# Patient Record
Sex: FEMALE | Race: WHITE | Employment: OTHER | URBAN - METROPOLITAN AREA
[De-identification: names, ages, dates, MRNs, and addresses within clinical notes are randomized per-mention and may not be internally consistent; named-entity substitution may affect disease eponyms.]

---

## 2019-03-06 ENCOUNTER — HOSPITAL ENCOUNTER (OUTPATIENT)
Dept: PREADMISSION TESTING | Age: 69
Discharge: HOME | End: 2019-03-06
Payer: MEDICARE

## 2019-03-06 VITALS
BODY MASS INDEX: 26.68 KG/M2 | RESPIRATION RATE: 16 BRPM | TEMPERATURE: 97.9 F | SYSTOLIC BLOOD PRESSURE: 135 MMHG | HEART RATE: 45 BPM | HEIGHT: 67 IN | DIASTOLIC BLOOD PRESSURE: 71 MMHG | WEIGHT: 170 LBS

## 2019-03-06 DIAGNOSIS — R73.09 ELEVATED HEMOGLOBIN A1C: ICD-10-CM

## 2019-03-06 LAB
ABO + RH BLD: NORMAL
ANION GAP SERPL CALC-SCNC: 7 MMOL/L (ref 5–15)
APPEARANCE UR: CLEAR
BACTERIA URNS QL MICRO: NEGATIVE /HPF
BILIRUB UR QL: NEGATIVE
BLOOD GROUP ANTIBODIES SERPL: NORMAL
BUN SERPL-MCNC: 15 MG/DL (ref 6–20)
BUN/CREAT SERPL: 21 (ref 12–20)
CALCIUM SERPL-MCNC: 8.8 MG/DL (ref 8.5–10.1)
CHLORIDE SERPL-SCNC: 106 MMOL/L (ref 97–108)
CO2 SERPL-SCNC: 29 MMOL/L (ref 21–32)
COLOR UR: ABNORMAL
CREAT SERPL-MCNC: 0.71 MG/DL (ref 0.55–1.02)
EPITH CASTS URNS QL MICRO: ABNORMAL /LPF
ERYTHROCYTE [DISTWIDTH] IN BLOOD BY AUTOMATED COUNT: 13.2 % (ref 11.5–14.5)
EST. AVERAGE GLUCOSE BLD GHB EST-MCNC: 126 MG/DL
GLUCOSE SERPL-MCNC: 95 MG/DL (ref 65–100)
GLUCOSE UR STRIP.AUTO-MCNC: NEGATIVE MG/DL
HBA1C MFR BLD: 6 % (ref 4.2–6.3)
HCT VFR BLD AUTO: 38 % (ref 35–47)
HGB BLD-MCNC: 12.1 G/DL (ref 11.5–16)
HGB UR QL STRIP: ABNORMAL
HYALINE CASTS URNS QL MICRO: ABNORMAL /LPF (ref 0–5)
INR PPP: 1 (ref 0.9–1.1)
KETONES UR QL STRIP.AUTO: NEGATIVE MG/DL
LEUKOCYTE ESTERASE UR QL STRIP.AUTO: NEGATIVE
MCH RBC QN AUTO: 31.5 PG (ref 26–34)
MCHC RBC AUTO-ENTMCNC: 31.8 G/DL (ref 30–36.5)
MCV RBC AUTO: 99 FL (ref 80–99)
NITRITE UR QL STRIP.AUTO: NEGATIVE
NRBC # BLD: 0 K/UL (ref 0–0.01)
NRBC BLD-RTO: 0 PER 100 WBC
PH UR STRIP: 7 [PH] (ref 5–8)
PLATELET # BLD AUTO: 245 K/UL (ref 150–400)
PMV BLD AUTO: 10.9 FL (ref 8.9–12.9)
POTASSIUM SERPL-SCNC: 4.2 MMOL/L (ref 3.5–5.1)
PROT UR STRIP-MCNC: NEGATIVE MG/DL
PROTHROMBIN TIME: 10.3 SEC (ref 9–11.1)
RBC # BLD AUTO: 3.84 M/UL (ref 3.8–5.2)
RBC #/AREA URNS HPF: ABNORMAL /HPF (ref 0–5)
SODIUM SERPL-SCNC: 142 MMOL/L (ref 136–145)
SP GR UR REFRACTOMETRY: 1.01 (ref 1–1.03)
SPECIMEN EXP DATE BLD: NORMAL
UA: UC IF INDICATED,UAUC: ABNORMAL
UROBILINOGEN UR QL STRIP.AUTO: 0.2 EU/DL (ref 0.2–1)
WBC # BLD AUTO: 6.2 K/UL (ref 3.6–11)
WBC URNS QL MICRO: ABNORMAL /HPF (ref 0–4)

## 2019-03-06 PROCEDURE — 80048 BASIC METABOLIC PNL TOTAL CA: CPT

## 2019-03-06 PROCEDURE — 36415 COLL VENOUS BLD VENIPUNCTURE: CPT

## 2019-03-06 PROCEDURE — 83036 HEMOGLOBIN GLYCOSYLATED A1C: CPT

## 2019-03-06 PROCEDURE — 85027 COMPLETE CBC AUTOMATED: CPT

## 2019-03-06 PROCEDURE — 85610 PROTHROMBIN TIME: CPT

## 2019-03-06 PROCEDURE — 81001 URINALYSIS AUTO W/SCOPE: CPT

## 2019-03-06 PROCEDURE — 86900 BLOOD TYPING SEROLOGIC ABO: CPT

## 2019-03-06 RX ORDER — GUANFACINE 2 MG/1
2 TABLET, EXTENDED RELEASE ORAL
COMMUNITY

## 2019-03-06 RX ORDER — LOSARTAN POTASSIUM 50 MG/1
50 TABLET ORAL
COMMUNITY

## 2019-03-06 RX ORDER — DICLOFENAC SODIUM 75 MG/1
75 TABLET, DELAYED RELEASE ORAL
COMMUNITY
End: 2019-03-28

## 2019-03-06 RX ORDER — ESCITALOPRAM OXALATE 5 MG/1
5 TABLET ORAL DAILY
COMMUNITY

## 2019-03-06 RX ORDER — ASPIRIN 81 MG/1
81 TABLET ORAL
COMMUNITY
End: 2019-03-28

## 2019-03-06 RX ORDER — ALENDRONATE SODIUM 35 MG/1
35 TABLET ORAL
COMMUNITY
End: 2019-03-28

## 2019-03-06 NOTE — PERIOP NOTES
DO NOT EAT ANYTHING AFTER MIDNIGHT, except as instructed THE NIGHT BEFORE SURGERY. PT GIVEN OPPORTUNITY TO ASK ADDITIONAL QUESTIONS. PRE-OPERATIVE INSTRUCTIONS REVIEWED WITH PATIENT. PATIENT GIVEN 6-PACK OF CHG WIPES. INSTRUCTIONS REVIEWED ON USE OF CHG WIPES. PATIENT GIVEN SSI INFECTION FAQ SHEET. MRSA / MSSA TREATMENT INSTRUCTION SHEET GIVEN WITH AN EXPLANATION TO PATIENT THAT THEY WILL BE NOTIFIED IF TREATMENT INSTRUCTIONS NEED TO BE INITIATED. PATIENT WAS GIVEN THE OPPORTUNITY TO ASK QUESTIONS ON THE INFORMATION PROVIDED.

## 2019-03-07 PROBLEM — R73.09 ELEVATED HEMOGLOBIN A1C: Status: ACTIVE | Noted: 2019-03-07

## 2019-03-07 LAB
BACTERIA SPEC CULT: NORMAL
BACTERIA SPEC CULT: NORMAL
SERVICE CMNT-IMP: NORMAL

## 2019-03-21 RX ORDER — CELECOXIB 200 MG/1
200 CAPSULE ORAL ONCE
Status: CANCELLED | OUTPATIENT
Start: 2019-03-21 | End: 2019-03-21

## 2019-03-21 RX ORDER — ACETAMINOPHEN 500 MG
1000 TABLET ORAL ONCE
Status: CANCELLED | OUTPATIENT
Start: 2019-03-21 | End: 2019-03-21

## 2019-03-21 RX ORDER — CEFAZOLIN SODIUM/WATER 2 G/20 ML
2 SYRINGE (ML) INTRAVENOUS ONCE
Status: CANCELLED | OUTPATIENT
Start: 2019-03-27 | End: 2019-03-27

## 2019-03-21 RX ORDER — PREGABALIN 75 MG/1
75 CAPSULE ORAL ONCE
Status: CANCELLED | OUTPATIENT
Start: 2019-03-21 | End: 2019-03-21

## 2019-03-27 ENCOUNTER — ANESTHESIA EVENT (OUTPATIENT)
Dept: SURGERY | Age: 69
DRG: 468 | End: 2019-03-27
Payer: MEDICARE

## 2019-03-27 ENCOUNTER — HOSPITAL ENCOUNTER (INPATIENT)
Age: 69
LOS: 1 days | Discharge: HOME HEALTH | DRG: 468 | End: 2019-03-28
Attending: ORTHOPAEDIC SURGERY | Admitting: ORTHOPAEDIC SURGERY
Payer: MEDICARE

## 2019-03-27 ENCOUNTER — ANESTHESIA (OUTPATIENT)
Dept: SURGERY | Age: 69
DRG: 468 | End: 2019-03-27
Payer: MEDICARE

## 2019-03-27 DIAGNOSIS — T84.038A MECHANICAL LOOSENING OF PROSTHETIC KNEE, INITIAL ENCOUNTER (HCC): Primary | ICD-10-CM

## 2019-03-27 DIAGNOSIS — Z96.659 MECHANICAL LOOSENING OF PROSTHETIC KNEE, INITIAL ENCOUNTER (HCC): Primary | ICD-10-CM

## 2019-03-27 PROBLEM — T84.052A: Status: ACTIVE | Noted: 2019-03-27

## 2019-03-27 PROBLEM — Z96.651 S/P TOTAL KNEE REPLACEMENT, RIGHT: Status: ACTIVE | Noted: 2019-03-27

## 2019-03-27 LAB
ABO + RH BLD: NORMAL
BLOOD GROUP ANTIBODIES SERPL: NORMAL
GLUCOSE BLD STRIP.AUTO-MCNC: 101 MG/DL (ref 65–100)
SERVICE CMNT-IMP: ABNORMAL
SPECIMEN EXP DATE BLD: NORMAL

## 2019-03-27 PROCEDURE — 74011250637 HC RX REV CODE- 250/637: Performed by: ORTHOPAEDIC SURGERY

## 2019-03-27 PROCEDURE — 0SPC0JZ REMOVAL OF SYNTHETIC SUBSTITUTE FROM RIGHT KNEE JOINT, OPEN APPROACH: ICD-10-PCS | Performed by: ORTHOPAEDIC SURGERY

## 2019-03-27 PROCEDURE — 74011250636 HC RX REV CODE- 250/636

## 2019-03-27 PROCEDURE — 77030018836 HC SOL IRR NACL ICUM -A: Performed by: ORTHOPAEDIC SURGERY

## 2019-03-27 PROCEDURE — 76210000006 HC OR PH I REC 0.5 TO 1 HR: Performed by: ORTHOPAEDIC SURGERY

## 2019-03-27 PROCEDURE — 77030006835 HC BLD SAW SAG STRY -B: Performed by: ORTHOPAEDIC SURGERY

## 2019-03-27 PROCEDURE — 82962 GLUCOSE BLOOD TEST: CPT

## 2019-03-27 PROCEDURE — 74011000250 HC RX REV CODE- 250: Performed by: ORTHOPAEDIC SURGERY

## 2019-03-27 PROCEDURE — 77030008467 HC STPLR SKN COVD -B: Performed by: ORTHOPAEDIC SURGERY

## 2019-03-27 PROCEDURE — 77030000032 HC CUF TRNQT ZIMM -B: Performed by: ORTHOPAEDIC SURGERY

## 2019-03-27 PROCEDURE — 77030027138 HC INCENT SPIROMETER -A

## 2019-03-27 PROCEDURE — 0SRC0J9 REPLACEMENT OF RIGHT KNEE JOINT WITH SYNTHETIC SUBSTITUTE, CEMENTED, OPEN APPROACH: ICD-10-PCS | Performed by: ORTHOPAEDIC SURGERY

## 2019-03-27 PROCEDURE — 77030010783 HC BOWL MX BN CEM J&J -B: Performed by: ORTHOPAEDIC SURGERY

## 2019-03-27 PROCEDURE — 74011000250 HC RX REV CODE- 250

## 2019-03-27 PROCEDURE — 77030020788: Performed by: ORTHOPAEDIC SURGERY

## 2019-03-27 PROCEDURE — 77030031139 HC SUT VCRL2 J&J -A: Performed by: ORTHOPAEDIC SURGERY

## 2019-03-27 PROCEDURE — 76010000177 HC OR TIME 5 TO 5.5 HR INTENSV-TIER 1: Performed by: ORTHOPAEDIC SURGERY

## 2019-03-27 PROCEDURE — C1776 JOINT DEVICE (IMPLANTABLE): HCPCS | Performed by: ORTHOPAEDIC SURGERY

## 2019-03-27 PROCEDURE — 77030002991 HC SUT QUILL SSPC -B: Performed by: ORTHOPAEDIC SURGERY

## 2019-03-27 PROCEDURE — 86900 BLOOD TYPING SEROLOGIC ABO: CPT

## 2019-03-27 PROCEDURE — 36415 COLL VENOUS BLD VENIPUNCTURE: CPT

## 2019-03-27 PROCEDURE — 77030028907 HC WRP KNEE WO BGS SOLM -B

## 2019-03-27 PROCEDURE — 77030034850: Performed by: ORTHOPAEDIC SURGERY

## 2019-03-27 PROCEDURE — 74011250636 HC RX REV CODE- 250/636: Performed by: ORTHOPAEDIC SURGERY

## 2019-03-27 PROCEDURE — C1713 ANCHOR/SCREW BN/BN,TIS/BN: HCPCS | Performed by: ORTHOPAEDIC SURGERY

## 2019-03-27 PROCEDURE — 64450 NJX AA&/STRD OTHER PN/BRANCH: CPT

## 2019-03-27 PROCEDURE — 65270000029 HC RM PRIVATE

## 2019-03-27 PROCEDURE — 77030008975 HC BN CANC CHP LIFV -E: Performed by: ORTHOPAEDIC SURGERY

## 2019-03-27 PROCEDURE — 77030033067 HC SUT PDO STRATFX SPIR J&J -B: Performed by: ORTHOPAEDIC SURGERY

## 2019-03-27 PROCEDURE — 76060000042 HC ANESTHESIA 5.5 TO 6 HR: Performed by: ORTHOPAEDIC SURGERY

## 2019-03-27 PROCEDURE — 74011250636 HC RX REV CODE- 250/636: Performed by: PHYSICIAN ASSISTANT

## 2019-03-27 PROCEDURE — 77030014077 HC TOWER MX CEM J&J -C: Performed by: ORTHOPAEDIC SURGERY

## 2019-03-27 PROCEDURE — 88311 DECALCIFY TISSUE: CPT

## 2019-03-27 PROCEDURE — 77030039266 HC ADH SKN EXOFIN S2SG -A: Performed by: ORTHOPAEDIC SURGERY

## 2019-03-27 PROCEDURE — 3E0T3BZ INTRODUCTION OF ANESTHETIC AGENT INTO PERIPHERAL NERVES AND PLEXI, PERCUTANEOUS APPROACH: ICD-10-PCS | Performed by: ANESTHESIOLOGY

## 2019-03-27 PROCEDURE — 77030011640 HC PAD GRND REM COVD -A: Performed by: ORTHOPAEDIC SURGERY

## 2019-03-27 PROCEDURE — 77030018822 HC SLV COMPR FT COVD -B

## 2019-03-27 PROCEDURE — 74011250637 HC RX REV CODE- 250/637: Performed by: PHYSICIAN ASSISTANT

## 2019-03-27 PROCEDURE — 88305 TISSUE EXAM BY PATHOLOGIST: CPT

## 2019-03-27 PROCEDURE — 77030002933 HC SUT MCRYL J&J -A: Performed by: ORTHOPAEDIC SURGERY

## 2019-03-27 PROCEDURE — 74011250636 HC RX REV CODE- 250/636: Performed by: ANESTHESIOLOGY

## 2019-03-27 PROCEDURE — 77030018846 HC SOL IRR STRL H20 ICUM -A: Performed by: ORTHOPAEDIC SURGERY

## 2019-03-27 PROCEDURE — 77030012935 HC DRSG AQUACEL BMS -B: Performed by: ORTHOPAEDIC SURGERY

## 2019-03-27 DEVICE — IMPLANTABLE DEVICE
Type: IMPLANTABLE DEVICE | Site: KNEE | Status: FUNCTIONAL
Brand: NEXGEN® TRABECULAR METAL™

## 2019-03-27 DEVICE — SMARTSET GMV HIGH PERFORMANCE GENTAMICIN MEDIUM VISCOSITY BONE CEMENT 40G
Type: IMPLANTABLE DEVICE | Site: KNEE | Status: FUNCTIONAL
Brand: SMARTSET

## 2019-03-27 DEVICE — CONE TIB M H25MM AP31MM ML31MM TRABECULAR KNEE MTL: Type: IMPLANTABLE DEVICE | Site: KNEE | Status: FUNCTIONAL

## 2019-03-27 DEVICE — IMPLANTABLE DEVICE: Type: IMPLANTABLE DEVICE | Site: KNEE | Status: FUNCTIONAL

## 2019-03-27 DEVICE — IMPLANTABLE DEVICE
Type: IMPLANTABLE DEVICE | Site: KNEE | Status: FUNCTIONAL
Brand: TRABECULAR METAL®

## 2019-03-27 DEVICE — BONE CHIP CANC CRSH 1-8MM 40ML -- PCAN1/2 PRESERVON: Type: IMPLANTABLE DEVICE | Site: KNEE | Status: FUNCTIONAL

## 2019-03-27 RX ORDER — FENTANYL CITRATE 50 UG/ML
INJECTION, SOLUTION INTRAMUSCULAR; INTRAVENOUS AS NEEDED
Status: DISCONTINUED | OUTPATIENT
Start: 2019-03-27 | End: 2019-03-27 | Stop reason: HOSPADM

## 2019-03-27 RX ORDER — LOSARTAN POTASSIUM 50 MG/1
50 TABLET ORAL
Status: DISCONTINUED | OUTPATIENT
Start: 2019-03-27 | End: 2019-03-28 | Stop reason: HOSPADM

## 2019-03-27 RX ORDER — FENTANYL CITRATE 50 UG/ML
25 INJECTION, SOLUTION INTRAMUSCULAR; INTRAVENOUS
Status: DISCONTINUED | OUTPATIENT
Start: 2019-03-27 | End: 2019-03-27 | Stop reason: HOSPADM

## 2019-03-27 RX ORDER — OXYCODONE AND ACETAMINOPHEN 5; 325 MG/1; MG/1
1 TABLET ORAL AS NEEDED
Status: DISCONTINUED | OUTPATIENT
Start: 2019-03-27 | End: 2019-03-27 | Stop reason: HOSPADM

## 2019-03-27 RX ORDER — ONDANSETRON 2 MG/ML
4 INJECTION INTRAMUSCULAR; INTRAVENOUS
Status: DISCONTINUED | OUTPATIENT
Start: 2019-03-27 | End: 2019-03-28 | Stop reason: HOSPADM

## 2019-03-27 RX ORDER — NALOXONE HYDROCHLORIDE 0.4 MG/ML
0.4 INJECTION, SOLUTION INTRAMUSCULAR; INTRAVENOUS; SUBCUTANEOUS AS NEEDED
Status: DISCONTINUED | OUTPATIENT
Start: 2019-03-27 | End: 2019-03-28 | Stop reason: HOSPADM

## 2019-03-27 RX ORDER — ACETAMINOPHEN 500 MG
1000 TABLET ORAL ONCE
Status: COMPLETED | OUTPATIENT
Start: 2019-03-27 | End: 2019-03-27

## 2019-03-27 RX ORDER — HYDROXYZINE HYDROCHLORIDE 10 MG/1
10 TABLET, FILM COATED ORAL
Status: DISCONTINUED | OUTPATIENT
Start: 2019-03-27 | End: 2019-03-28 | Stop reason: HOSPADM

## 2019-03-27 RX ORDER — ACETAMINOPHEN 500 MG
500 TABLET ORAL
Status: DISCONTINUED | OUTPATIENT
Start: 2019-03-27 | End: 2019-03-28 | Stop reason: HOSPADM

## 2019-03-27 RX ORDER — MIDAZOLAM HYDROCHLORIDE 1 MG/ML
1 INJECTION, SOLUTION INTRAMUSCULAR; INTRAVENOUS AS NEEDED
Status: DISCONTINUED | OUTPATIENT
Start: 2019-03-27 | End: 2019-03-27 | Stop reason: HOSPADM

## 2019-03-27 RX ORDER — SODIUM CHLORIDE 0.9 % (FLUSH) 0.9 %
5-40 SYRINGE (ML) INJECTION AS NEEDED
Status: DISCONTINUED | OUTPATIENT
Start: 2019-03-27 | End: 2019-03-27 | Stop reason: HOSPADM

## 2019-03-27 RX ORDER — DEXAMETHASONE SODIUM PHOSPHATE 4 MG/ML
INJECTION, SOLUTION INTRA-ARTICULAR; INTRALESIONAL; INTRAMUSCULAR; INTRAVENOUS; SOFT TISSUE AS NEEDED
Status: DISCONTINUED | OUTPATIENT
Start: 2019-03-27 | End: 2019-03-27 | Stop reason: HOSPADM

## 2019-03-27 RX ORDER — SODIUM CHLORIDE 9 MG/ML
50 INJECTION, SOLUTION INTRAVENOUS CONTINUOUS
Status: DISCONTINUED | OUTPATIENT
Start: 2019-03-27 | End: 2019-03-27 | Stop reason: HOSPADM

## 2019-03-27 RX ORDER — SODIUM CHLORIDE, SODIUM LACTATE, POTASSIUM CHLORIDE, CALCIUM CHLORIDE 600; 310; 30; 20 MG/100ML; MG/100ML; MG/100ML; MG/100ML
75 INJECTION, SOLUTION INTRAVENOUS CONTINUOUS
Status: DISCONTINUED | OUTPATIENT
Start: 2019-03-27 | End: 2019-03-27 | Stop reason: HOSPADM

## 2019-03-27 RX ORDER — MIDAZOLAM HYDROCHLORIDE 1 MG/ML
INJECTION, SOLUTION INTRAMUSCULAR; INTRAVENOUS AS NEEDED
Status: DISCONTINUED | OUTPATIENT
Start: 2019-03-27 | End: 2019-03-27 | Stop reason: HOSPADM

## 2019-03-27 RX ORDER — DIPHENHYDRAMINE HYDROCHLORIDE 50 MG/ML
12.5 INJECTION, SOLUTION INTRAMUSCULAR; INTRAVENOUS AS NEEDED
Status: DISCONTINUED | OUTPATIENT
Start: 2019-03-27 | End: 2019-03-27 | Stop reason: HOSPADM

## 2019-03-27 RX ORDER — BUPIVACAINE HYDROCHLORIDE 5 MG/ML
INJECTION, SOLUTION EPIDURAL; INTRACAUDAL AS NEEDED
Status: DISCONTINUED | OUTPATIENT
Start: 2019-03-27 | End: 2019-03-27 | Stop reason: HOSPADM

## 2019-03-27 RX ORDER — LIDOCAINE HYDROCHLORIDE 10 MG/ML
0.1 INJECTION, SOLUTION EPIDURAL; INFILTRATION; INTRACAUDAL; PERINEURAL AS NEEDED
Status: DISCONTINUED | OUTPATIENT
Start: 2019-03-27 | End: 2019-03-27 | Stop reason: HOSPADM

## 2019-03-27 RX ORDER — ESCITALOPRAM OXALATE 10 MG/1
5 TABLET ORAL DAILY
Status: DISCONTINUED | OUTPATIENT
Start: 2019-03-28 | End: 2019-03-28 | Stop reason: HOSPADM

## 2019-03-27 RX ORDER — CELECOXIB 200 MG/1
200 CAPSULE ORAL ONCE
Status: COMPLETED | OUTPATIENT
Start: 2019-03-27 | End: 2019-03-27

## 2019-03-27 RX ORDER — MORPHINE SULFATE 10 MG/ML
2 INJECTION, SOLUTION INTRAMUSCULAR; INTRAVENOUS
Status: DISCONTINUED | OUTPATIENT
Start: 2019-03-27 | End: 2019-03-27 | Stop reason: HOSPADM

## 2019-03-27 RX ORDER — SODIUM CHLORIDE 0.9 % (FLUSH) 0.9 %
5-40 SYRINGE (ML) INJECTION EVERY 8 HOURS
Status: DISCONTINUED | OUTPATIENT
Start: 2019-03-27 | End: 2019-03-28 | Stop reason: HOSPADM

## 2019-03-27 RX ORDER — HYDROMORPHONE HYDROCHLORIDE 2 MG/ML
INJECTION, SOLUTION INTRAMUSCULAR; INTRAVENOUS; SUBCUTANEOUS AS NEEDED
Status: DISCONTINUED | OUTPATIENT
Start: 2019-03-27 | End: 2019-03-27 | Stop reason: HOSPADM

## 2019-03-27 RX ORDER — OXYCODONE HYDROCHLORIDE 5 MG/1
10 TABLET ORAL
Status: DISCONTINUED | OUTPATIENT
Start: 2019-03-27 | End: 2019-03-28 | Stop reason: HOSPADM

## 2019-03-27 RX ORDER — HYDROMORPHONE HYDROCHLORIDE 1 MG/ML
0.2 INJECTION, SOLUTION INTRAMUSCULAR; INTRAVENOUS; SUBCUTANEOUS
Status: DISCONTINUED | OUTPATIENT
Start: 2019-03-27 | End: 2019-03-27 | Stop reason: HOSPADM

## 2019-03-27 RX ORDER — KETAMINE HYDROCHLORIDE 10 MG/ML
INJECTION, SOLUTION INTRAMUSCULAR; INTRAVENOUS AS NEEDED
Status: DISCONTINUED | OUTPATIENT
Start: 2019-03-27 | End: 2019-03-27 | Stop reason: HOSPADM

## 2019-03-27 RX ORDER — HYDROMORPHONE HYDROCHLORIDE 1 MG/ML
0.5 INJECTION, SOLUTION INTRAMUSCULAR; INTRAVENOUS; SUBCUTANEOUS
Status: DISCONTINUED | OUTPATIENT
Start: 2019-03-27 | End: 2019-03-28 | Stop reason: HOSPADM

## 2019-03-27 RX ORDER — PROPOFOL 10 MG/ML
INJECTION, EMULSION INTRAVENOUS
Status: DISCONTINUED | OUTPATIENT
Start: 2019-03-27 | End: 2019-03-27 | Stop reason: HOSPADM

## 2019-03-27 RX ORDER — SODIUM CHLORIDE, SODIUM LACTATE, POTASSIUM CHLORIDE, CALCIUM CHLORIDE 600; 310; 30; 20 MG/100ML; MG/100ML; MG/100ML; MG/100ML
INJECTION, SOLUTION INTRAVENOUS
Status: DISCONTINUED | OUTPATIENT
Start: 2019-03-27 | End: 2019-03-27 | Stop reason: HOSPADM

## 2019-03-27 RX ORDER — CEFAZOLIN SODIUM/WATER 2 G/20 ML
2 SYRINGE (ML) INTRAVENOUS EVERY 8 HOURS
Status: COMPLETED | OUTPATIENT
Start: 2019-03-28 | End: 2019-03-28

## 2019-03-27 RX ORDER — KETOROLAC TROMETHAMINE 30 MG/ML
15 INJECTION, SOLUTION INTRAMUSCULAR; INTRAVENOUS EVERY 6 HOURS
Status: DISCONTINUED | OUTPATIENT
Start: 2019-03-28 | End: 2019-03-28 | Stop reason: HOSPADM

## 2019-03-27 RX ORDER — FACIAL-BODY WIPES
10 EACH TOPICAL DAILY PRN
Status: DISCONTINUED | OUTPATIENT
Start: 2019-03-29 | End: 2019-03-28 | Stop reason: HOSPADM

## 2019-03-27 RX ORDER — SODIUM CHLORIDE 0.9 % (FLUSH) 0.9 %
5-40 SYRINGE (ML) INJECTION AS NEEDED
Status: DISCONTINUED | OUTPATIENT
Start: 2019-03-27 | End: 2019-03-28 | Stop reason: HOSPADM

## 2019-03-27 RX ORDER — POLYETHYLENE GLYCOL 3350 17 G/17G
17 POWDER, FOR SOLUTION ORAL DAILY
Status: DISCONTINUED | OUTPATIENT
Start: 2019-03-28 | End: 2019-03-28 | Stop reason: HOSPADM

## 2019-03-27 RX ORDER — SODIUM CHLORIDE 0.9 % (FLUSH) 0.9 %
5-40 SYRINGE (ML) INJECTION EVERY 8 HOURS
Status: DISCONTINUED | OUTPATIENT
Start: 2019-03-27 | End: 2019-03-27 | Stop reason: HOSPADM

## 2019-03-27 RX ORDER — EPHEDRINE SULFATE 50 MG/ML
INJECTION, SOLUTION INTRAVENOUS AS NEEDED
Status: DISCONTINUED | OUTPATIENT
Start: 2019-03-27 | End: 2019-03-27 | Stop reason: HOSPADM

## 2019-03-27 RX ORDER — MIDAZOLAM HYDROCHLORIDE 1 MG/ML
0.5 INJECTION, SOLUTION INTRAMUSCULAR; INTRAVENOUS
Status: DISCONTINUED | OUTPATIENT
Start: 2019-03-27 | End: 2019-03-27 | Stop reason: HOSPADM

## 2019-03-27 RX ORDER — FENTANYL CITRATE 50 UG/ML
50 INJECTION, SOLUTION INTRAMUSCULAR; INTRAVENOUS AS NEEDED
Status: DISCONTINUED | OUTPATIENT
Start: 2019-03-27 | End: 2019-03-27 | Stop reason: HOSPADM

## 2019-03-27 RX ORDER — OXYCODONE HYDROCHLORIDE 5 MG/1
5 TABLET ORAL
Status: DISCONTINUED | OUTPATIENT
Start: 2019-03-27 | End: 2019-03-28 | Stop reason: HOSPADM

## 2019-03-27 RX ORDER — SODIUM CHLORIDE 9 MG/ML
125 INJECTION, SOLUTION INTRAVENOUS CONTINUOUS
Status: DISCONTINUED | OUTPATIENT
Start: 2019-03-27 | End: 2019-03-28 | Stop reason: HOSPADM

## 2019-03-27 RX ORDER — TRANEXAMIC ACID 100 MG/ML
INJECTION, SOLUTION INTRAVENOUS AS NEEDED
Status: DISCONTINUED | OUTPATIENT
Start: 2019-03-27 | End: 2019-03-27 | Stop reason: HOSPADM

## 2019-03-27 RX ORDER — AMOXICILLIN 250 MG
1 CAPSULE ORAL 2 TIMES DAILY
Status: DISCONTINUED | OUTPATIENT
Start: 2019-03-27 | End: 2019-03-28 | Stop reason: HOSPADM

## 2019-03-27 RX ORDER — CEFAZOLIN SODIUM/WATER 2 G/20 ML
2 SYRINGE (ML) INTRAVENOUS ONCE
Status: COMPLETED | OUTPATIENT
Start: 2019-03-27 | End: 2019-03-27

## 2019-03-27 RX ORDER — ONDANSETRON 2 MG/ML
4 INJECTION INTRAMUSCULAR; INTRAVENOUS AS NEEDED
Status: DISCONTINUED | OUTPATIENT
Start: 2019-03-27 | End: 2019-03-27 | Stop reason: HOSPADM

## 2019-03-27 RX ORDER — ONDANSETRON 2 MG/ML
INJECTION INTRAMUSCULAR; INTRAVENOUS AS NEEDED
Status: DISCONTINUED | OUTPATIENT
Start: 2019-03-27 | End: 2019-03-27 | Stop reason: HOSPADM

## 2019-03-27 RX ORDER — PREGABALIN 75 MG/1
75 CAPSULE ORAL ONCE
Status: COMPLETED | OUTPATIENT
Start: 2019-03-27 | End: 2019-03-27

## 2019-03-27 RX ADMIN — FENTANYL CITRATE 25 MCG: 50 INJECTION, SOLUTION INTRAMUSCULAR; INTRAVENOUS at 16:59

## 2019-03-27 RX ADMIN — HYDROMORPHONE HYDROCHLORIDE 0.5 MG: 2 INJECTION, SOLUTION INTRAMUSCULAR; INTRAVENOUS; SUBCUTANEOUS at 18:20

## 2019-03-27 RX ADMIN — KETAMINE HYDROCHLORIDE 10 MG: 10 INJECTION, SOLUTION INTRAMUSCULAR; INTRAVENOUS at 13:25

## 2019-03-27 RX ADMIN — SODIUM CHLORIDE, SODIUM LACTATE, POTASSIUM CHLORIDE, CALCIUM CHLORIDE: 600; 310; 30; 20 INJECTION, SOLUTION INTRAVENOUS at 14:27

## 2019-03-27 RX ADMIN — KETAMINE HYDROCHLORIDE 10 MG: 10 INJECTION, SOLUTION INTRAMUSCULAR; INTRAVENOUS at 13:48

## 2019-03-27 RX ADMIN — DEXAMETHASONE SODIUM PHOSPHATE 8 MG: 4 INJECTION, SOLUTION INTRA-ARTICULAR; INTRALESIONAL; INTRAMUSCULAR; INTRAVENOUS; SOFT TISSUE at 13:16

## 2019-03-27 RX ADMIN — ONDANSETRON 4 MG: 2 INJECTION INTRAMUSCULAR; INTRAVENOUS at 18:21

## 2019-03-27 RX ADMIN — MIDAZOLAM HYDROCHLORIDE 2 MG: 1 INJECTION, SOLUTION INTRAMUSCULAR; INTRAVENOUS at 13:02

## 2019-03-27 RX ADMIN — FENTANYL CITRATE 25 MCG: 50 INJECTION, SOLUTION INTRAMUSCULAR; INTRAVENOUS at 17:08

## 2019-03-27 RX ADMIN — SENNOSIDES AND DOCUSATE SODIUM 1 TABLET: 8.6; 5 TABLET ORAL at 22:15

## 2019-03-27 RX ADMIN — SODIUM CHLORIDE 125 ML/HR: 900 INJECTION, SOLUTION INTRAVENOUS at 22:31

## 2019-03-27 RX ADMIN — SODIUM CHLORIDE 125 ML/HR: 900 INJECTION, SOLUTION INTRAVENOUS at 18:43

## 2019-03-27 RX ADMIN — SODIUM CHLORIDE, SODIUM LACTATE, POTASSIUM CHLORIDE, CALCIUM CHLORIDE: 600; 310; 30; 20 INJECTION, SOLUTION INTRAVENOUS at 16:51

## 2019-03-27 RX ADMIN — SODIUM CHLORIDE, SODIUM LACTATE, POTASSIUM CHLORIDE, CALCIUM CHLORIDE: 600; 310; 30; 20 INJECTION, SOLUTION INTRAVENOUS at 12:56

## 2019-03-27 RX ADMIN — CELECOXIB 200 MG: 200 CAPSULE ORAL at 11:55

## 2019-03-27 RX ADMIN — MIDAZOLAM HYDROCHLORIDE 3 MG: 1 INJECTION, SOLUTION INTRAMUSCULAR; INTRAVENOUS at 12:42

## 2019-03-27 RX ADMIN — FENTANYL CITRATE 75 MCG: 50 INJECTION, SOLUTION INTRAMUSCULAR; INTRAVENOUS at 12:42

## 2019-03-27 RX ADMIN — Medication 10 ML: at 22:16

## 2019-03-27 RX ADMIN — PREGABALIN 75 MG: 75 CAPSULE ORAL at 11:55

## 2019-03-27 RX ADMIN — ACETAMINOPHEN 1000 MG: 500 TABLET ORAL at 11:54

## 2019-03-27 RX ADMIN — ACETAMINOPHEN 500 MG: 500 TABLET ORAL at 22:15

## 2019-03-27 RX ADMIN — HYDROMORPHONE HYDROCHLORIDE 0.5 MG: 2 INJECTION, SOLUTION INTRAMUSCULAR; INTRAVENOUS; SUBCUTANEOUS at 18:07

## 2019-03-27 RX ADMIN — KETAMINE HYDROCHLORIDE 10 MG: 10 INJECTION, SOLUTION INTRAMUSCULAR; INTRAVENOUS at 14:21

## 2019-03-27 RX ADMIN — BUPIVACAINE HYDROCHLORIDE 11 MG: 5 INJECTION, SOLUTION EPIDURAL; INTRACAUDAL at 13:04

## 2019-03-27 RX ADMIN — SODIUM CHLORIDE, POTASSIUM CHLORIDE, SODIUM LACTATE AND CALCIUM CHLORIDE 75 ML/HR: 600; 310; 30; 20 INJECTION, SOLUTION INTRAVENOUS at 12:00

## 2019-03-27 RX ADMIN — KETAMINE HYDROCHLORIDE 20 MG: 10 INJECTION, SOLUTION INTRAMUSCULAR; INTRAVENOUS at 13:15

## 2019-03-27 RX ADMIN — FENTANYL CITRATE 25 MCG: 50 INJECTION, SOLUTION INTRAMUSCULAR; INTRAVENOUS at 15:24

## 2019-03-27 RX ADMIN — PROPOFOL 50 MCG/KG/MIN: 10 INJECTION, EMULSION INTRAVENOUS at 13:07

## 2019-03-27 RX ADMIN — Medication 2 G: at 16:58

## 2019-03-27 RX ADMIN — EPHEDRINE SULFATE 5 MG: 50 INJECTION, SOLUTION INTRAVENOUS at 17:30

## 2019-03-27 RX ADMIN — Medication 2 G: at 13:10

## 2019-03-27 NOTE — BRIEF OP NOTE
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 
   Joseline Aguilar MD - Primary Surgical Assistant: Porter Vera PA-C Surgical Staff: 
Circ-1: Clark Magaña Circ-Relief: Khadar Cerna RN Physician Assistant: Madalyn Watkins PA-C Scrub Tech-1: Marquez Patel Scrub RN-Relief: Franchesca Taylor RN Surg Asst-Relief: Adal Ruth Event Time In Time Out Incision Start 22 [id number] Incision Close Anesthesia: Spinal  
Estimated Blood Loss: 300cc Specimens:  
ID Type Source Tests Collected by Time Destination 1 : right knee interface Frozen Section Knee   William Rabago MD 3/27/2019 1404 Pathology 2 : right knee synovium Fresh Knee   William Rabago MD 3/27/2019 1410 Pathology 3 : right knee femoral interface  Fresh Knee   William Rabago MD 3/27/2019 1411 Pathology 4 : right tibial interface membrane  Fresh Knee   William Rabago MD 3/27/2019 1411 Pathology Findings: mechanical loosening of prosthetic right knee with bone lysis Complications: none Implants:  
Implant Name Type Inv. Item Serial No.  Lot No. LRB No. Used Action CEMENT BNE GENTAMC MV 40GM -- SMARTSET ENDURANCE - SN/A   CEMENT BNE GENTAMC MV 40GM -- SMARTSET ENDURANCE N/A  [facility] [employer]TH[employer] [id number] Right 2 Implanted STEM EXTENSION NEXGEN 145 MM; 100 MM 12 MM STRAIGHT TIVANIUM STERILE REPLACEMENT SCREW IMPLANT FEMORAL; KNEE -354-12 - [id number]  STEM EXTENSION NEXGEN 145 MM; 100 MM 12 MM STRAIGHT TIVANIUM STERILE REPLACEMENT SCREW IMPLANT FEMORAL; KNEE -312-80 [id number] [employer] N/A  Right 1 Implanted CONE TIB AUG MED [id number] -- TRABECULAR METAL - SN/A   CONE TIB AUG MED [id number] -- TRABECULAR METAL N/A  [employer] [id number] Right 1 Implanted BASEPLT TIB WDGD NXGN PC --  - SN/A  BASEPLT TIB WDGD NXGN PC --  N/A [facility] [id number] Right 1 Implanted BLOCK FEM DST NXGN E 5MM TM --  - SN/A   BLOCK FEM DST NXGN E 5MM TM --  N/A  [facility] [id number] Right 1 Implanted BLOCK FEM DST NXGN E 5MM TM --  - [id number]  BLOCK FEM DST NXGN E 5MM TM --  [id number] [employer] N/A  Right 1 Implanted BLOCK FEM POST NXGN E 10MM TM --  - SN/A   BLOCK FEM POST NXGN E 10MM TM --  N/A  [employer] [id number] Right 1 Implanted [address] [place] SZ E RT --  - [id number]  COMPNT KNEE FEM LCCK SZ E RT --  [id number] [employer] N/A  Right 1 Implanted EXT STEM FEM FLUT NXGN [id number] --  - [id number]  EXT STEM FEM FLUT NXGN [id number] --  [id number] [employer] N/A Right 1 Implanted BIOMET SERIES A ASYMMETRICAL PATELLA 34MM X 8.5MM   N/A  [id number] Right 1 Implanted BLOCK FEM POST NXGN E 10MM TM --  - [id number]  BLOCK FEM POST NXGN E 10MM TM --  [id number] [employer] N/A  Right 1 Implanted BLOCK FEM POST NXGN E 5MM TM --  - SN/A   BLOCK FEM POST NXGN E 5MM TM --  N/A  [employer] [id number] Right 1 Implanted CONE FEM DIAPHYSL SM 30MM RT -- TRABECULAR METAL - SN/A   CONE FEM DIAPHYSL SM 30MM RT -- TRABECULAR METAL N/A  [employer] [id number] Right 1 Implanted BONE CHIP CANC [address] 1-8MM 40ML -- PCAN1/2 PRESERVON - [id number]  BONE CHIP CANC [address] 1-8MM 40ML -- PCAN1/2 PRESERVON [id number] [facility] TISSUE BANK N/A  Right 1 Implanted CEMENT BNE GENTAMC MV 40GM -- SMARTSET ENDURANCE - SN/A  CEMENT BNE GENTAMC MV 40GM -- SMARTSET ENDURANCE N/A [facility] ORTHOPEDICS [id number] Right 2 Implanted SURFACE ARTICULAR NEXGEN LPS-FLEX; PROLONG SZ EF 17 MM POLYETHYLENE STERILE FIXED; NET SHAPE MOLDED; POSTERIOR STABILIZED KNEE; TIBIAL -231-45 - SN/A  SURFACE ARTICULAR NEXGEN LPS-FLEX; PROLONG SZ EF 17 MM POLYETHYLENE STERILE FIXED; NET SHAPE MOLDED; POSTERIOR STABILIZED KNEE; TIBIAL -231-17 N/A Calista Gilford [id number] Right 1 Implanted

## 2019-03-27 NOTE — ANESTHESIA POSTPROCEDURE EVALUATION
Post-Anesthesia Evaluation and Assessment Patient: Caren Decker MRN: [id number]  SSN: [id number] YOB: 1950  Age: 77 y.o. Sex: female I have evaluated the patient and they are stable and ready for discharge from the PACU. Cardiovascular Function/Vital Signs Visit Vitals /59 Pulse (!) 56 Temp 36.7 °C (98 °F) Resp 11 Ht 5' 7\" (1.702 m) Wt 77.1 kg (170 lb) SpO2 95% BMI 26.63 kg/m² Patient is status post Spinal anesthesia for Procedure(s): REVISION OF RIGHT TOTAL KNEE REPLACEMENT (SPINAL W/ IV SEDATION). Nausea/Vomiting: None Postoperative hydration reviewed and adequate. Pain: 
Pain Scale 1: Numeric (0 - 10) (03/27/19 1829) Pain Intensity 1: 0 (03/27/19 1829) Managed Neurological Status:  
Neuro (WDL): Exceptions to WDL (03/27/19 1829) Neuro Neurologic State: Drowsy (03/27/19 1829) LUE Motor Response: Purposeful (03/27/19 1829) LLE Motor Response: Purposeful;Numbness (03/27/19 1829) RUE Motor Response: Purposeful (03/27/19 1829) RLE Motor Response: Purposeful;Numbness (03/27/19 1829) At baseline Mental Status, Level of Consciousness: Alert and  oriented to person, place, and time Pulmonary Status:  
O2 Device: CO2 nasal cannula (03/27/19 1829) Adequate oxygenation and airway patent Complications related to anesthesia: None Post-anesthesia assessment completed. No concerns Signed By: Marilynn Echeevrria MD   
 March 27, 2019 Procedure(s): REVISION OF RIGHT TOTAL KNEE REPLACEMENT (SPINAL W/ IV SEDATION). spinal, general - backup <BSHSIANPOST> Vitals Value Taken Time /52 3/27/2019  6:45 PM  
Temp 36.7 °C (98 °F) 3/27/2019  6:29 PM  
Pulse 58 3/27/2019  6:50 PM  
Resp 16 3/27/2019  6:50 PM  
SpO2 95 % 3/27/2019  6:50 PM  
Vitals shown include unvalidated device data.

## 2019-03-27 NOTE — ANESTHESIA PREPROCEDURE EVALUATION
Relevant Problems No relevant active problems Anesthetic History No history of anesthetic complications Review of Systems / Medical History Patient summary reviewed, nursing notes reviewed and pertinent labs reviewed Pulmonary Comments: Former Smoker Neuro/Psych Psychiatric history Cardiovascular Hypertension Dysrhythmias Exercise tolerance: >4 METS 
  
GI/Hepatic/Renal 
  
GERD: well controlled Endo/Other Arthritis and cancer Other Findings Physical Exam 
 
Airway Mallampati: II 
TM Distance: > 6 cm Neck ROM: normal range of motion Mouth opening: Normal 
 
 Cardiovascular Regular rate and rhythm,  S1 and S2 normal,  no murmur, click, rub, or gallop Rhythm: regular Rate: normal 
 
 
 
 Dental 
 
Dentition: Caps/crowns Pulmonary Breath sounds clear to auscultation Abdominal 
GI exam deferred Other Findings Anesthetic Plan ASA: 2 Anesthesia type: spinal and general - backup Post-op pain plan if not by surgeon: peripheral nerve block single Induction: Intravenous Anesthetic plan and risks discussed with: Patient

## 2019-03-27 NOTE — ROUTINE PROCESS
Patient: Danielle Lazcano MRN: [id number]  SSN: [id number] YOB: 1950  Age: 77 y.o. Sex: female Patient is status post Procedure(s): REVISION OF RIGHT TOTAL KNEE REPLACEMENT (SPINAL W/ IV SEDATION). Surgeon(s) and Role: 
   Rigoberto Hernandez MD - Primary Local/Dose/Irrigation:  SURGICAL PAIN SOLUTION 120 ML Peripheral IV 03/27/19 Left;Posterior Hand (Active) Site Assessment Clean, dry, & intact 3/27/2019 11:48 AM  
Phlebitis Assessment 0 3/27/2019 11:48 AM  
Infiltration Assessment 0 3/27/2019 11:48 AM  
Dressing Status Clean, dry, & intact 3/27/2019 11:48 AM  
Dressing Type Tape;Transparent 3/27/2019 11:48 AM  
Hub Color/Line Status Green; Infusing 3/27/2019 11:48 AM  
                 
 
 
 
Dressing/Packing:    
Splint/Cast:  ] Other:  MATTHEWS OUT AT END

## 2019-03-27 NOTE — PERIOP NOTES
01:50 PM 
 
SPOKE TO PATIENT'S  (JT REES) TO UPDATE HIM ON PATIENT'S SURGICAL STATUS 
 
03:50 SPOKE TO PATIENT'S EDMAURICIO AGRAWALIS () TO UPDATE HIM ON PATIENT'S SURGICAL STATUS

## 2019-03-27 NOTE — ANESTHESIA PROCEDURE NOTES
Spinal Block Performed by: Emmanuelle Medina DO Authorized by: Emmanuelle Medina DO  
 
Pre-procedure: Indications: primary anesthetic  Preanesthetic Checklist: risks and benefits discussed and timeout performed Spinal Block:  
Patient Position:  Seated Prep: Betadine Location:  L3-4 Technique:  Single shot Needle:  
Needle Type:  Pencil-tip Needle Gauge:  25 G Attempts:  1 Events: CSF confirmed, no blood with aspiration and no paresthesia Assessment: 
Insertion:  Uncomplicated Patient tolerance:  Patient tolerated the procedure well with no immediate complications

## 2019-03-27 NOTE — PERIOP NOTES
12:42pm timeout done for an  Right knee adductor canal nerve block with Dr. Jesica Mars for patient having a revision right knee replacement with Dr. Shazia Mehta. Patient placed on monitor and oxygen, sedation meds given within appropriate time. 30 ml of 0.5% ropivacaine used for the block. 3 mg of versed and 75 mcg of fentanyl.  Post procedure vitals taken and then patient was taken in OR

## 2019-03-27 NOTE — PERIOP NOTES
TRANSFER - OUT REPORT: 
 
Verbal report given to Manju(name) on Elidia Class  being transferred to (unit) for routine post - op Report consisted of patients Situation, Background, Assessment and  
Recommendations(SBAR). Time Pre op antibiotic given:1310, 5918 Anesthesia Stop time: 8907 Clinton Present on Transfer to floor:no Information from the following report(s) SBAR, Procedure Summary, Intake/Output and MAR was reviewed with the receiving nurse. Opportunity for questions and clarification was provided. Is the patient on 02? YES 
     L/Min 2 Is the patient on a monitor? NO Is the nurse transporting with the patient? NO Surgical Waiting Area notified of patient's transfer from PACU? YES The following personal items collected during your admission accompanied patient upon transfer:  
Dental Appliance: Dental Appliances: Other (comment)(capped teeth in back of mouth) Vision: Visual Aid: Glasses(with ) Hearing Aid:   
Jewelry:   
Clothing: Clothing: Other (comment)(bag of clothing with patient in PACU) Other Valuables:   
Valuables sent to safe:

## 2019-03-27 NOTE — ANESTHESIA PROCEDURE NOTES
Peripheral Block    Performed by: Jacque Lamb DO  Authorized by: Jacque Lamb DO       Pre-procedure: Indications: at surgeon's request and post-op pain management    Preanesthetic Checklist: patient identified, risks and benefits discussed, site marked, timeout performed and patient being monitored      Block Type:   Block Type:   Adductor canal  Laterality:  Right  Monitoring:  Standard ASA monitoring, continuous pulse ox, frequent vital sign checks, heart rate, responsive to questions and oxygen  Injection Technique:  Single shot  Procedures: ultrasound guided    Patient Position: supine  Prep: DuraPrep    Needle Type:  Stimuplex  Needle Gauge:  22 G  Needle Localization:  Ultrasound guidance    Assessment:  Number of attempts:  1  Injection Assessment:  Incremental injection every 5 mL, local visualized surrounding nerve on ultrasound, negative aspiration for blood, no paresthesia and no intravascular symptoms  Patient tolerance:  Patient tolerated the procedure well with no immediate complications

## 2019-03-28 VITALS
WEIGHT: 170 LBS | SYSTOLIC BLOOD PRESSURE: 124 MMHG | BODY MASS INDEX: 26.68 KG/M2 | OXYGEN SATURATION: 100 % | HEART RATE: 59 BPM | HEIGHT: 67 IN | DIASTOLIC BLOOD PRESSURE: 66 MMHG | TEMPERATURE: 97.9 F | RESPIRATION RATE: 16 BRPM

## 2019-03-28 LAB
ANION GAP SERPL CALC-SCNC: 8 MMOL/L (ref 5–15)
BUN SERPL-MCNC: 13 MG/DL (ref 6–20)
BUN/CREAT SERPL: 18 (ref 12–20)
CALCIUM SERPL-MCNC: 8 MG/DL (ref 8.5–10.1)
CHLORIDE SERPL-SCNC: 104 MMOL/L (ref 97–108)
CO2 SERPL-SCNC: 25 MMOL/L (ref 21–32)
CREAT SERPL-MCNC: 0.71 MG/DL (ref 0.55–1.02)
GLUCOSE SERPL-MCNC: 164 MG/DL (ref 65–100)
HGB BLD-MCNC: 10.3 G/DL (ref 11.5–16)
POTASSIUM SERPL-SCNC: 3.8 MMOL/L (ref 3.5–5.1)
SODIUM SERPL-SCNC: 137 MMOL/L (ref 136–145)

## 2019-03-28 PROCEDURE — 97535 SELF CARE MNGMENT TRAINING: CPT

## 2019-03-28 PROCEDURE — 97161 PT EVAL LOW COMPLEX 20 MIN: CPT

## 2019-03-28 PROCEDURE — 97116 GAIT TRAINING THERAPY: CPT

## 2019-03-28 PROCEDURE — 94762 N-INVAS EAR/PLS OXIMTRY CONT: CPT

## 2019-03-28 PROCEDURE — 74011250637 HC RX REV CODE- 250/637: Performed by: PHYSICIAN ASSISTANT

## 2019-03-28 PROCEDURE — 97165 OT EVAL LOW COMPLEX 30 MIN: CPT

## 2019-03-28 PROCEDURE — 80048 BASIC METABOLIC PNL TOTAL CA: CPT

## 2019-03-28 PROCEDURE — 97110 THERAPEUTIC EXERCISES: CPT

## 2019-03-28 PROCEDURE — 74011250636 HC RX REV CODE- 250/636: Performed by: PHYSICIAN ASSISTANT

## 2019-03-28 PROCEDURE — 36415 COLL VENOUS BLD VENIPUNCTURE: CPT

## 2019-03-28 PROCEDURE — 85018 HEMOGLOBIN: CPT

## 2019-03-28 RX ORDER — ACETAMINOPHEN 500 MG
500 TABLET ORAL
Qty: 100 TAB | Refills: 0 | Status: SHIPPED
Start: 2019-03-28

## 2019-03-28 RX ORDER — OXYCODONE HYDROCHLORIDE 10 MG/1
5-10 TABLET ORAL
Qty: 60 TAB | Refills: 0 | Status: SHIPPED | OUTPATIENT
Start: 2019-03-28 | End: 2019-04-04

## 2019-03-28 RX ORDER — AMOXICILLIN 250 MG
1 CAPSULE ORAL
Qty: 60 TAB | Refills: 0 | Status: SHIPPED | OUTPATIENT
Start: 2019-03-28

## 2019-03-28 RX ADMIN — KETOROLAC TROMETHAMINE 15 MG: 30 INJECTION, SOLUTION INTRAMUSCULAR at 11:04

## 2019-03-28 RX ADMIN — POLYETHYLENE GLYCOL 3350 17 G: 17 POWDER, FOR SOLUTION ORAL at 09:09

## 2019-03-28 RX ADMIN — ACETAMINOPHEN 500 MG: 500 TABLET ORAL at 09:09

## 2019-03-28 RX ADMIN — OXYCODONE HYDROCHLORIDE 5 MG: 5 TABLET ORAL at 09:09

## 2019-03-28 RX ADMIN — OXYCODONE HYDROCHLORIDE 5 MG: 5 TABLET ORAL at 12:02

## 2019-03-28 RX ADMIN — Medication 2 G: at 00:32

## 2019-03-28 RX ADMIN — Medication 2 G: at 09:09

## 2019-03-28 RX ADMIN — KETOROLAC TROMETHAMINE 15 MG: 30 INJECTION, SOLUTION INTRAMUSCULAR at 00:33

## 2019-03-28 RX ADMIN — ACETAMINOPHEN 500 MG: 500 TABLET ORAL at 06:24

## 2019-03-28 RX ADMIN — ESCITALOPRAM OXALATE 5 MG: 10 TABLET ORAL at 11:03

## 2019-03-28 RX ADMIN — RIVAROXABAN 10 MG: 10 TABLET, FILM COATED ORAL at 03:17

## 2019-03-28 RX ADMIN — KETOROLAC TROMETHAMINE 15 MG: 30 INJECTION, SOLUTION INTRAMUSCULAR at 06:24

## 2019-03-28 RX ADMIN — SENNOSIDES AND DOCUSATE SODIUM 1 TABLET: 8.6; 5 TABLET ORAL at 09:09

## 2019-03-28 RX ADMIN — OXYCODONE HYDROCHLORIDE 5 MG: 5 TABLET ORAL at 03:17

## 2019-03-28 RX ADMIN — Medication 10 ML: at 06:25

## 2019-03-28 NOTE — PROGRESS NOTES
Bedside and Verbal shift change report given to Shaniqua Dixon RN (oncoming nurse) by Enzo Sandoval RN (offgoing nurse). Report included the following information SBAR, Kardex, OR Summary, Procedure Summary, Intake/Output, MAR and Recent Results.

## 2019-03-28 NOTE — OP NOTES
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 PROCEDURE PERFORMED:  Revision right total knee arthroplasty, femur, tibia, and patella. SURGEON:  Natali Moscoso MD    FIRST ASSISTANT:  Silke Monk PA-C    ANESTHESIA:  Spinal sedation as well as adductor canal block. COMPLICATIONS:  None. SPECIMENS REMOVED:  Frozen section x1 and tissue culture x3. IMPLANTS:  Amber size E CCK femoral component with 14 x 130 mm cementless stem, 15 mm posterolateral and 10 mm posteromedial augments, 5 mm distal medial and distal lateral augments, and a small diaphyseal trabecular metal femoral cone, size 4 AP wedge tibial component with 12 x 100 mm cementless stem, 31 mm trabecular metal metaphyseal cone, and 17 mm LPS polyethylene insert, size 34 mm Biomet asymmetric patella. ESTIMATED BLOOD LOSS:  300 mL    INDICATIONS:  The patient is a 78-year-old female who is approximately 20 years out from right primary total knee replacement. She has had progressive pain and instability with radiographs demonstrating significant periprosthetic osteolysis and loosening of the tibial component. Infection workup was negative with normal sed rate and C-reactive protein. She presents for revision of her right total knee. Risks, benefits, and alternatives of procedure were reviewed with her in detail and she desires to proceed. PROCEDURE:  Anesthesia team placed an adductor canal block before taking the patient to the operating room, they also placed a spinal.  Preoperative IV antibiotics were administered.   Clinton catheter was inserted and a padded pneumatic tourniquet was placed around the right upper thigh. Right lower extremity was prepped and draped in usual sterile fashion. Tourniquet was inflated to 275. I utilized her existing midline longitudinal scar and extended proximal and distal, excising the existing scar. Medial and parapatellar arthrotomy was performed. There was a large effusion with normal-appearing joint fluid. There was extensive polyethylene wear granuloma. Anterior synovectomy was performed. There was a large granuloma extending up under the quad proximally, and skin incision was extended and quadriceps snip was performed to allow complete access to that for excision and also to allow adequate exposure of the tibia. The synovial specimen was sent for culture, and specimens from the femoral and tibial interface membranes were later also collected for culture. The tibial component was grossly loose. There was extensive lysis behind the medial and lateral femur. The post was removed from the tibial polyethylene using an osteotome, and we worked around the femur with flexible osteotomes and removed in a retrograde fashion with minimal effort. While we did not lose any bone removing the component, there was extensive lysis involving condyles, both distal but more posterior. Tibia was delivered anteriorly and the tibial component was easily removed. Synovectomy was completed and all granuloma was removed from the proximal tibia as well. I sent a specimen from the tibial interface membrane for frozen section which demonstrated no evidence of acute inflammation. There was extensive lysis on the proximal tibia as well, mostly medial and lateral.  Cement mantle was intact centrally with surrounding bone that was intact. All cement was removed from around the post with osteotomes. Tibial canal was reamed for a 12 mm cementless stem.   We then broached over a trial stem for a 31 mm tibial cone which had satisfactory fixation in the remodeled cortical bone centrally. There was some perforation of the posterior tibia present from the granuloma and lysis before we began broaching, but the broach bypassed this. Trial cone was inserted. Fresh neutral proximal tibial resection was performed. The only supportive bone was essentially around almost the entire periphery of the proximal tibia, around the cortex. Size 4 AP wedge and tibial trial was inserted with 12 mm x 100 mm stem with satisfactory position and fit. Gaps were assessed. Femoral canal was reamed for a 14 x 130 mm cementless stem. Over a reamer, I made a fresh distal femoral cut, and there was some supportive bone both medially and laterally, albeit very thin wisps of bone running from anterior to posterior on both the medial and lateral condyles. I placed the size E trial femur in the wound, and looking at the gaps, we were going to need to augment distally and posteriorly. Distal femur was prepared for a size E component with distal augments in place. We actually had some bony contact on the anterior flange of the femur laterally. After preparing for a 15 mm posterolateral and 10 mm posteromedial augments, there was some rotational stability of the femur, as the box did not really provide much rotational support, as when we cut the box, we only had to remove a little bit of bone posteriorly. Femoral trial was inserted. I had to do significant posterior release of the femur and a moderate medial release to produce satisfactory coronal plane stability and soft tissue tension with a 17 mm insert. At this point, the tourniquet was released with the time of two hours and hemostasis obtained with the Bovie. Wound was copiously irrigated. The patellar component was fixed but had significant lysis and was easily removed with an osteotome. All  granuloma was removed from the remaining patella.   I was able to skim the back surface and it was thick enough to use a Biomet asymmetric patellar component prepared for 34 mm component, and with the trial in place and extensor clip closed proximally, the patella tracked centrally using no thumbs technique. After the real implants were built on the back table, the tourniquet was re-inflated and trials were removed. Wound was copiously irrigated by pulse lavage. We cemented in two batches using surface and metaphyseal cement technique on both the tibia and the femur. Tibia was cemented first.  With that batch, we also cemented a 5 mm augment posterolaterally on the femoral component on top of a 10 mm augment to produce a 15 mm posterolateral augment. After adequate setup time, the femoral component was cemented using similar technique. Knee was reduced with a 17 mm trial insert in place and patella was cemented in place. Periarticular soft tissues were injected with a solution containing 0.5% ropivacaine with epinephrine as well as clonidine and Toradol. Tourniquet was released and hemostasis was obtained with the Bovie. Wound was irrigated. The real tibial polyethylene was inserted. Quadriceps snip was repaired proximally with interrupted #2 Ethibond sutures and medial arthrotomy at the patella was also closed with one or two #2 Ethibond sutures. Remainder of the arthrotomy was closed with a running #2 Stratafix suture. Skin and subcutaneous layers were closed in layered fashion with Vicryl and a running Monocryl subcuticular stitch. The wound was dressed with Dermabond and Aquacel occlusive dressing as well as sterile compressive dressing. Clinton catheter was removed, and the patient was transported to postanesthesia care unit in stable condition. All counts were correct at the end of the procedure.     Murali Javed MD      [id number]  D:  03/27/2019 18:02  T:  03/27/2019 18:07  JOB #:  [id number]  CC:  MD Bishop Gladys Kumar MD

## 2019-03-28 NOTE — PROGRESS NOTES
Problem: Mobility Impaired (Adult and Pediatric) Goal: *Acute Goals and Plan of Care (Insert Text) Description Physical Therapy Goals Initiated 3/28/2019 1. Patient will move from supine to sit and sit to supine  and roll side to side in bed with modified independence within 4 days. 2. Patient will perform sit to stand with modified independence within 4 days. 3. Patient will ambulate with modified independence for 150 feet with the least restrictive device within 4 days. 4. Patient will ascend/descend 12 stairs with 1 handrail(s) with modified independence within 4 days. 5. Patient will perform home exercise program per protocol with modified independence within 4 days. 6. Patient will demonstrate AROM 0-90 degrees in operative joint within 4 days. 3/28/2019 1245 by Tosha Oneal, PT Outcome: Resolved/Met PHYSICAL THERAPY TREATMENT/DISCHARGE Patient: Robert Burns (31 y.o. 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 
REVISION OF RIGHT TOTAL KNEE REPLACEMENT (SPINAL W/ IV SEDATION) (Right) 1 Day Post-Op Precautions: Fall, WBAT Chart, physical therapy assessment, plan of care and goals were reviewed. ASSESSMENT: 
Patient making excellent gains with her mobility and pain management. She was able to manage stairs using cane and railing and was trained in methods to get into her truck using the running board. Reviewed activity recommendations and restrictions and use of ice. She is clear for discharge home from a PT standpoint once her RW is delivered and RN is aware. Progression toward goals: 
?      Improving appropriately and progressing toward goals ? Improving slowly and progressing toward goals ?      Not making progress toward goals and plan of care will be adjusted PLAN: 
Patient will be discharged from acute skilled physical therapy at this time. Rationale for discharge: 
? Goals Achieved ? Pepe Sidhu ? Patient not participating in therapy ? Other: 
Discharge Recommendations:  Home Health Further Equipment Recommendations for Discharge:  RW has been ordered and will be delivered prior to discharge SUBJECTIVE:  
Patient stated ? I can't believe I had surgery yesterday! It's amazing! .? OBJECTIVE DATA SUMMARY:  
Critical Behavior: 
Neurologic State: Alert Functional Mobility Training: 
Bed Mobility: 
  
Supine to Sit: Modified independent Sit to Supine: Modified independent Scooting: Modified independent Transfers: 
Sit to Stand: Modified independent; Adaptive equipment; Additional time Stand to Sit: Modified independent; Adaptive equipment; Additional time Balance: 
Sitting: Intact; Without support Standing: Intact; With support Ambulation/Gait Training: 
Distance (ft): 150 Feet (ft) Assistive Device: Gait belt;Walker, rolling Ambulation - Level of Assistance: Modified independent; Additional time; Adaptive equipment Gait Abnormalities: Antalgic;Decreased step clearance; Step to gait(progressed to step through pattern) Right Side Weight Bearing: As tolerated Left Side Weight Bearing: Full Base of Support: Widened Stance: Right decreased Speed/Joie: Pace decreased (<100 feet/min) Step Length: Left shortened;Right shortened Swing Pattern: Right asymmetrical 
  
Interventions: Safety awareness training; Tactile cues; Verbal cues; Visual/Demos Stairs: 
Number of Stairs Trained: (4) Stairs - Level of Assistance: Adaptive equipment; Additional time;Supervision(for safety) Rail Use: (practice with cane & rail & mimicking running board truck) Functional Measure: Therapeutic Exercises:  
 
Pain: Pain Scale 1: Numeric (0 - 10) Pain Intensity 1: 5 Pain Location 1: Hip Pain Orientation 1: Left Pain Description 1: Aching Pain Intervention(s) 1: Medication (see MAR) Activity Tolerance:  
Good Please refer to the flowsheet for vital signs taken during this treatment. After treatment:  
? Patient left in no apparent distress sitting up in chair ? Patient left in no apparent distress in bed, ice in place ? Call bell left within reach ? Nursing notified ? Caregiver present ? Bed alarm activated COMMUNICATION/COLLABORATION:  
The patient?s plan of care was discussed with: Occupational Therapist, Registered Nurse, Physician and  Reji Handley PT Time Calculation: 23 mins

## 2019-03-28 NOTE — PROGRESS NOTES
Orthopaedics Daily Progress Note Date of Surgery:  3/27/2019 Patient: Brian Gallego YOB: 1950  Age: 77 y.o. SUBJECTIVE:  
1 Day Post-Op following REVISION OF RIGHT TOTAL KNEE REPLACEMENT (SPINAL W/ IV SEDATION). The patient's post operative pain is well controlled. No CP/SOB. No N/V. PT eval went well. Needs one more PT session. OBJECTIVE:  
 
Vital Signs:   
 
Visit Vitals /66 (BP Patient Position: At rest) Pulse (!) 59 Temp 97.9 °F (36.6 °C) Resp 16 Ht 5' 7\" (1.702 m) Wt 77.1 kg (170 lb) SpO2 100% BMI 26.63 kg/m² Physical Exam: 
General: A&Ox3. The patient is cooperative, and in no acute distress. Respiratory: Respirations are unlabored. Surgical site(s): dressing clean, dry Musculoskeletal: Calves are soft, supple, and non-tender upon palpation. Motor 5/5. Neurological:  Neurovascularly intact with good dorsi and plantar flexion. 2+ DP RLE Laboratory Values:            
Recent Results (from the past 12 hour(s)) METABOLIC PANEL, BASIC Collection Time: 03/28/19  3:23 AM  
Result Value Ref Range Sodium 137 136 - 145 mmol/L Potassium 3.8 3.5 - 5.1 mmol/L Chloride 104 97 - 108 mmol/L  
 CO2 25 21 - 32 mmol/L Anion gap 8 5 - 15 mmol/L Glucose 164 (H) 65 - 100 mg/dL BUN 13 6 - 20 MG/DL Creatinine 0.71 0.55 - 1.02 MG/DL  
 BUN/Creatinine ratio 18 12 - 20 GFR est AA >60 >60 ml/min/1.73m2 GFR est non-AA >60 >60 ml/min/1.73m2 Calcium 8.0 (L) 8.5 - 10.1 MG/DL  
HEMOGLOBIN Collection Time: 03/28/19  3:23 AM  
Result Value Ref Range HGB 10.3 (L) 11.5 - 16.0 g/dL PLAN:  
 
S/P REVISION OF RIGHT TOTAL KNEE REPLACEMENT (SPINAL W/ IV SEDATION) -Continue WBAT. -Mobilize and continue with PT/OT until discharged Hemodynamics Hgb today is 10.3. Acute blood loss anemia as expected. Patient asymptomatic. Continue to monitor. Wound Monitor postop dressing; no postop dressing changes necessary. Reinforce PRN. Post Operative Pain Pain Control: stable, mild-to-moderate joint symptoms intermittently, reasonably well controlled by current meds. DVT Prophylaxis Continue with SCD'S, Ankle Pump Exercises. xarelto Discharge Disposition Discharge plan: Home with Home Health pending PT juani. Signed By: Sharmila Leggett PA-C March 28, 2019 10:38 AM

## 2019-03-28 NOTE — PROGRESS NOTES
Primary Nurse Lacie Stafford and Tara Hopson RN performed a dual skin assessment on this patient No impairment noted Benji score is 20 Patient watched the discharge joint video. I have reviewed discharge instructions with the patient. The patient verbalized understanding. Provided opportunity for any questions. Patient has filled prescriptions and all personal belongings with her (clothes, wallet, , and cell phone).

## 2019-03-28 NOTE — DISCHARGE INSTRUCTIONS
Post-op Discharge Instructions Following Total Joint Replacement  Mariam Steinberg MD  [address]  [phone], ext [phone]  Follow-up Office Visit   See Dr. Charleen Jaeger approximately 3-4 weeks from date of surgery. Call [phone], ext [phone] to make an appointment. Activity   Use your walker for ambulation. Weight bearing as tolerated unless instructed otherwise by the physical therapist. Get up every hour you are awake and take a brief walk. Lengthen walking distance daily as your strength improves.  Continue using your walker until seen in the office for your first follow up visit.  Practice your exercises 3 times daily as instructed by the physical therapist. Karen Butler for 20 minutes after exercising.  No driving until seen in the office for your first follow up visit. Incision Care   The light brown Aquacel surgical dressing is waterproof and is to remain on your incision for 7 days. On the 7th day, carefully lift the edge of the dressing to break the adhesive seal and gently peel it off.  If your Aquacel dressings comes loose or falls off before the 7th day, replace it with a dry sterile gauze dressing and change this dressing daily. Once there is no drainage on the bandage, you mean leave the incision open to air.  You may take a shower with the Aquacel dressing in place. After you remove the Aquacel dressing on day 7, you may continue to shower and get your incision wet in the shower. Do not submerge your incision under water in a bathtub, hot tub, swimming pool, etc. until after you have been evaluated at your first office visit. Medications   Blood Clot Prevention: Take Eliquis 2.5 mg twice daily  as prescribed by Dr. Charleen Jaeger for 4 weeks postop.  Pain Management: Take pain medication as prescribed; wean yourself off of pain medication as your pain lessens. Take with food.  You make also take Tylenol every 4-6 hours as needed for pain. Do not exceed 3 grams (3000mg) per day.    Place an ice bag on or around the incision for 20 minutes on / 20 minutes off as needed throughout the day and night, especially after exercising.  Stool Softener: You may want to take a stool softener (such as Senokot-S or Colace) to prevent constipation while taking pain medication. If constipation occurs, you may also use a laxative (such as Dulcolax tablets, Miralax, or a suppository). Diet   Resume usual diet at home. Drink plenty of fluids. Eat foods high in fiber and protein. Calcium and Vitamin D supplements recommended. Avoid alcoholic beverages. No smoking. When to call your Orthopaedic Surgeon: If you call after 5pm or on a weekend, the on call physician will return your call   Pain that is not relieved by pain medication, ice, and activity modification   Signs of infection (red incision, continuous drainage from the incision, malodorous drainage, persistent fever greater than 101 degrees Fahrenheit)   Signs of a blood clot in your leg (calf pain, tenderness, redness, and/or swelling of the lower leg)  ?   When to call your Primary Care Physician   Concerns about your medical conditions such as diabetes, high blood pressure, asthma, congestive heart failure   Call if blood sugars are elevated, if you have a persistent headache or dizziness, coughing or congestion, constipation or diarrhea, burning with urination, abnormal heart rate (fast or slow)  When to call 911 and go to the nearest Emergency Room   Acute onset of chest pain, shortness of breath, difficulty breathing

## 2019-03-28 NOTE — DISCHARGE SUMMARY
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     HISTORY OF PRESENT ILLNESS: The patient is a 23-year-old female who is approximately 20 years out from right primary total knee replacement. She has had progressive pain and instability with radiographs demonstrating significant periprosthetic osteolysis and loosening of the tibial component. Infection workup was negative with normal sed rate and C-reactive protein. She presents for revision of her right total knee. Risks, benefits, and alternatives of procedure were reviewed with her in detail and she desires to proceed.     HOSPITAL COURSE:  The patient underwent the aforementioned procedure on date of admission under spinal anesthesia with adductor canal block. There were no immediate postoperative complications. She was started on a multimodal pain regimen and DVT prophylaxis.     DISPOSITION: The patient made slow, steady progress with physical therapy and was appropriate for discharge to Home with Home Health in stable condition on postoperative day 1. DISCHARGE MEDICATIONS:  Reinitiate preadmission medications. In addition, the patient will be on Eliquis for DVT prophylaxis and low dose oxycodone and Tylenol for pain.     DISCHARGE INSTRUCTIONS:  Detailed printed instructions were provided to the patient. Follow up with Dr. Corinne Forts in approximately 3 weeks. The patient will receive home health physical therapy in the meantime.     Signed by: Jeannette Goode PA-C  3/28/2019

## 2019-03-28 NOTE — PROGRESS NOTES
Problem: Mobility Impaired (Adult and Pediatric) Goal: *Acute Goals and Plan of Care (Insert Text) Description Physical Therapy Goals Initiated 3/28/2019 1. Patient will move from supine to sit and sit to supine  and roll side to side in bed with modified independence within 4 days. 2. Patient will perform sit to stand with modified independence within 4 days. 3. Patient will ambulate with modified independence for 150 feet with the least restrictive device within 4 days. 4. Patient will ascend/descend 12 stairs with 1 handrail(s) with modified independence within 4 days. 5. Patient will perform home exercise program per protocol with modified independence within 4 days. 6. Patient will demonstrate AROM 0-90 degrees in operative joint within 4 days. Outcome: Progressing Towards Goal 
 PHYSICAL THERAPY EVALUATION Patient: Tamara Chu (71 y.o. 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 
REVISION OF RIGHT TOTAL KNEE REPLACEMENT (SPINAL W/ IV SEDATION) (Right) 1 Day Post-Op Precautions:   Fall, WBAT 
 
ASSESSMENT :  
Based on the objective data described below, the patient presents with Independent PLOF. Today presents with Supervision level overall for transfers after R TKA revision, POD1. Gait training completed 150 feet using a rolling walker and at the Supervision level of assistance. Reviewed exercises and started stair training. Will continue to follow while here in hospital to increase patient independence. We will follow up with 1 more session later today and expect that she will be able to discharge after second session. The following are barriers to independence while in acute care:  
-Cognitive and/or behavioral: none -Medical condition: ROM, strength and pain    
-Other:    
 
The patient will benefit from skilled acute intervention to address the above impairments and their rehabilitation potential is considered to be Excellent Discharge recommendations: Home health (to increase independence and safety) Patient's barriers to discharging home, in addition to above impairments: none. Equipment recommendations for successful discharge (if) home: patient has a RW that daughter is bringing in later today to fit to patient PLAN : 
Recommendations and Planned Interventions: stairs, gait training and exercises Frequency/Duration: Patient will be followed by physical therapy  twice daily to address goals. SUBJECTIVE:  
Patient stated ? I can't believe how good I feel! .? OBJECTIVE DATA SUMMARY:  
HISTORY:   
Past Medical History:  
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  
Procedure Laterality Date HX CATARACT REMOVAL Bilateral   
 HX HYSTERECTOMY  1996 [id number] [facility] HX KNEE REPLACEMENT Left 2012 TOTAL HIP ARTHROPLASTY Left 2011 Prior Level of Function/Home Situation: independent Personal factors and/or comorbidities impacting plan of care: R TKA revision Home Situation Home Environment: Private residence One/Two Story Residence: Two story Living Alone: No 
Support Systems: Spouse/Significant Other/Partner Patient Expects to be Discharged to[de-identified] Private residence Current DME Used/Available at Home: None EXAMINATION/PRESENTATION/DECISION MAKING:  
Critical Behavior: 
Neurologic State: Alert Hearing: Auditory Auditory Impairment: None Skin:  post op bandage intact with no drainage noted Edema: minimal 
Range Of Motion: AROM: Within functional limits(R knee -5 to 80 with post op bandage in place) Strength:   
Strength: Within functional limits(RLE 3/5) Tone & Sensation:  
Tone: Normal 
  
  
  
  
Sensation: Intact Coordination: 
Coordination: Within functional limits Vision:  
  
Functional Mobility: 
Bed Mobility: 
  
Supine to Sit: Modified independent Sit to Supine: Modified independent Scooting: Modified independent Transfers: 
Sit to Stand: Modified independent; Adaptive equipment; Additional time Stand to Sit: Modified independent; Adaptive equipment; Additional time Balance:  
Sitting: Intact; Without support Standing: Intact; With support Ambulation/Gait Training: 
Distance (ft): 150 Feet (ft) Assistive Device: Gait belt;Walker, rolling Ambulation - Level of Assistance: Supervision; Adaptive equipment; Additional time Gait Abnormalities: Antalgic;Decreased step clearance; Step to gait(progressed to step through pattern) Right Side Weight Bearing: As tolerated Left Side Weight Bearing: Full Base of Support: Widened Stance: Right decreased Speed/Joie: Pace decreased (<100 feet/min) Step Length: Left shortened;Right shortened Swing Pattern: Right asymmetrical 
  
Interventions: Safety awareness training; Tactile cues; Verbal cues; Visual/Demos Stairs: 
Number of Stairs Trained: 4 Stairs - Level of Assistance: Supervision; Additional time Rail Use: Both Therapeutic Exercises:  
Post op TKA exercises as described in patient education booklet Functional Measure: 
Barthel Index: 
Bathin Bladder: 10 Bowels: 10 
Groomin Dressin Feeding: 10 Mobility: 15 
Stairs: 5 Toilet Use: 10 Transfer (Bed to Chair and Back): 15 Total: 85/100 Percentage of impairment  
0% 1-19% 20-39% 40-59% 60-79% 80-99% 100% Barthel Score 0-100 100 99-80 79-60 59-40 20-39 1-19 
 0 The Barthel ADL Index: Guidelines 1. The index should be used as a record of what a patient does, not as a record of what a patient could do. 2. The main aim is to establish degree of independence from any help, physical or verbal, however minor and for whatever reason. 3. The need for supervision renders the patient not independent. 4. A patient's performance should be established using the best available evidence. Asking the patient, friends/relatives and nurses are the usual sources, but direct observation and common sense are also important. However direct testing is not needed. 5. Usually the patient's performance over the preceding 24-48 hours is important, but occasionally longer periods will be relevant. 6. Middle categories imply that the patient supplies over 50 per cent of the effort. 7. Use of aids to be independent is allowed. Edgar Heart., Barthel, D.W. (1784). Functional evaluation: the Barthel Index. [address] [facility] (14)2. Gaile Rubinstein neto ABDELRAHMAN Loja, Gifty Stein., Carin Truong., [facility], [address] [place] [address] (1999). Measuring the change indisability after inpatient rehabilitation; comparison of the responsiveness of the Barthel Index and Functional [place] Measure. Journal of Neurology, Neurosurgery, and Psychiatry, 66(4), 891-285. Ashley Bolanos, [place].TOM, ELANA Roberson, & Tess Rojas M.A. (2004.) Assessment of post-stroke quality of life in cost-effectiveness studies: The usefulness of the Barthel Index and the EuroQoL-5D. [facility], 13, 646-23 Physical Therapy Evaluation Charge Determination History Examination Presentation Decision-Making MEDIUM  Complexity : 1-2 comorbidities / personal factors will impact the outcome/ POC  MEDIUM Complexity : 3 Standardized tests and measures addressing body structure, function, activity limitation and / or participation in recreation  LOW Complexity : Stable, uncomplicated  LOW Complexity : FOTO score of  Based on the above components, the patient evaluation is determined to be of the following complexity level: LOW Pain: 
Patient reports pain is minimal with gait, increases somewhat with exercises, especially heel slide, but decreases at rest 
 
Activity Tolerance:  
Good Please refer to the flowsheet for vital signs taken during this treatment. After treatment patient left:  
Supine in bed Caregiver at bedside Call light within reach RN notified COMMUNICATION/EDUCATION:  
The patient?s plan of care was discussed with: Occupational Therapist, Registered Nurse, Physician and . Fall prevention education was provided and the patient/caregiver indicated understanding., Patient/family have participated as able in goal setting and plan of care. and Patient/family agree to work toward stated goals and plan of care. Thank you for this referral. 
Damaris Acosta, PT Time Calculation: 31 mins

## 2019-03-28 NOTE — PROGRESS NOTES
03/27/19 2212 03/27/19 2221 03/27/19 2227 Vitals Temp 97.5 °F (36.4 °C)  --   -- Temp Source Oral  --   --   
Pulse (Heart Rate) 63 66 (!) 58 Heart Rate Source Monitor  --   -- Resp Rate 16  --   --   
O2 Sat (%) 94 %  --   -- Level of Consciousness Alert  --   --   
/75 164/79 147/78 MAP (Calculated) 89 107 101 BP 1 Location Right arm  --   --   
BP 1 Method Automatic  --   --   
BP Patient Position At rest Sitting Standing Pt walked to the bathroom felt dizzy and nauseous during using the bathroom, got patient back to bed.

## 2019-03-28 NOTE — PROGRESS NOTES
Care Management Interventions PCP Verified by CM: Yes 
Palliative Care Criteria Met (RRAT>21 & CHF Dx)?: No 
Mode of Transport at Discharge: Self Transition of Care Consult (CM Consult): Home Health [address]: No 
Reason Outside [place]: Out of service area([facility]) MyChart Signup: No 
Discharge Durable Medical Equipment: No 
Health Maintenance Reviewed: Yes Physical Therapy Consult: Yes Occupational Therapy Consult: Yes Speech Therapy Consult: No 
Current Support Network: Lives with Spouse Confirm Follow Up Transport: Family Plan discussed with Pt/Family/Caregiver: Yes Freedom of Choice Offered: Yes [employer] Information Provided?: No 
Discharge Location Discharge Placement: Home with home health Reason for Admission:   Revision of Right Total Knee Replacement RRAT Score:      7 Plan for utilizing home health:      Yes, referral sent to [facility], [facility] per patient request. The doctor's office has already been in touch with the agency prior to surgery. Likelihood of Readmission:  low Transition of Care Plan:     [facility] and [place] [facility]  AMAYA

## 2019-03-28 NOTE — PROGRESS NOTES
OCCUPATIONAL THERAPY EVALUATION WITH DISCHARGE: Knee Patient: Leon Saunders (64 y.o. 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 
REVISION OF RIGHT TOTAL KNEE REPLACEMENT (SPINAL W/ IV SEDATION) (Right) 1 Day Post-Op Precautions:   Fall, WBAT 
 
ASSESSMENT : 
Based on the objective data described below, the patient presents with Supervision PLOF. Today presents with Modified independent upper body ADLs, Supervision lower body ADLs, and Modified independent and Supervision assist functional mobility at  level. The following are barriers to ADL independence while in acute care:  
- Cognitive and/or behavioral: none - Medical condition: none   
- Other:    
 
Discharge recommendations: None- home with  Barriers to discharging home, in addition to above listed impairments: none. Equipment recommendations for successful discharge (if) home: has all needed DME PLAN : 
Further skilled acute occupational therapy services are not indicated at this time. SUBJECTIVE:  
Patient stated I want to make sure I do it right.  OBJECTIVE DATA SUMMARY:  
HISTORY:  
Past Medical History:  
Diagnosis Date  Arrhythmia  Arthritis  Cancer ([address]  Chronic pain  Elevated hemoglobin A1c 3/7/2019  GERD (gastroesophageal reflux disease)  Hypertension  Psychiatric disorder ANXIETY  Thromboembolus [facility]) 2011 L LEG Past Surgical History:  
Procedure Laterality Date  HX CATARACT REMOVAL Bilateral   
 HX HYSTERECTOMY  1996 [address]  HX KNEE REPLACEMENT Left 2012 [facility] ARTHROPLASTY Left 2011 Prior Level of Function/Environment/Context: Home with . Indep with ADL task. Occupations in which the patient is/was successful, what are the barriers preventing that success:  
Performance Patterns (routines, roles, habits, and rituals):  
Personal Interests and/or values:  
Expanded or extensive additional review of patient history:  
 
Home Situation Home Environment: Private residence One/Two Story Residence: Two story Living Alone: No 
Support Systems: Spouse/Significant Other/Partner Patient Expects to be Discharged to[de-identified] Private residence Current DME Used/Available at Home: None Tub or Shower Type: Shower Hand dominance: Right EXAMINATION OF PERFORMANCE DEFICITS: 
Cognitive/Behavioral Status: 
Neurologic State: Alert Hearing: Auditory Auditory Impairment: None Vision/Perceptual: WDL Range of Motion: 
AROM: Within functional limits(R knee -5 to 80 with post op bandage in place) Strength: 
Strength: Within functional limits(RLE 3/5) Coordination: 
Coordination: Within functional limits Fine Motor Skills-Upper: Left Intact; Right Intact Gross Motor Skills-Upper: Left Intact; Right Intact Tone & Sensation: 
Tone: Normal 
Sensation: Intact Balance: 
Sitting: Intact; Without support Standing: Intact; With support Functional Mobility and Transfers for ADLs: 
Bed Mobility: 
Supine to Sit: Modified independent Sit to Supine: Modified independent Scooting: Modified independent Transfers: 
Sit to Stand: Modified independent; Adaptive equipment; Additional time Stand to Sit: Modified independent; Adaptive equipment; Additional time Bed to Chair: Modified independent Bathroom Mobility: Supervision/set up Toilet Transfer : Supervision ADL Assessment: 
Feeding: Independent Oral Facial Hygiene/Grooming: Independent Bathing: Supervision Upper Body Dressing: Modified independent Lower Body Dressing: Supervision Toileting: Supervision ADL Intervention and task modifications: Grooming Washing Hands: Modified independent Upper Body Dressing Assistance Dressing Assistance: Modified independent Bra: Modified independent Pullover Shirt: Modified independent Lower Body Dressing Assistance Underpants: Supervision/set-up Socks: Supervision/set-up Toileting Bladder Hygiene: Modified independent Bathing: Patient instructed and indicated understanding when bathing to not submerge wound in water, stand to shower or sponge bathe, follow instructions with returning to shower per MD. Dressing joint: Patient instructed and demonstrated understanding to don/doff Right LE first/last with Supervision. Patient instructed and demonstrated to don all clothing while sitting prior to standing, doff all clothing to knees while standing, then sit to doff clothing off from knees to feet in order to facilitate fall prevention, pain management, and energy conservation with Supervision. Home safety: Patient instructed and indicated understanding on home modifications and safety (raise height of ADL objects, appropriate height of chair surfaces, recliner safety, change of floor surfaces, clear pathways) to increase independence and fall prevention. Standing: Patient instructed and demonstrated during ADLs to walk up to sink/counter top/surfaces, step into walker to increase safety of joint and fall prevention with Modified independent. Patient educated about knee anatomy and educated to avoid rotation of Left LE. Instructed to apply concept to ADLs within the home (no twisting of knee during reaching across body, square off while using objects, slide objects along surfaces). Patient instructed and indicated understanding to increase amount of time standing, observe standing position during ADLs in order to increase even weight bearing through bilateral LEs in order to increase independence with ADLs. Goal to be reached 30 days post - op, per orthopedic surgeon or per PT. Functional Measure: 
Barthel Index: 
 
Bathin Bladder: 10 Bowels: 10 
Groomin Dressin Feeding: 10 Mobility: 15 
Stairs: 5 Toilet Use: 10 Transfer (Bed to Chair and Back): 15 Total: 85/100 Percentage of impairment  
0% 1-19% 20-39% 40-59% 60-79% 80-99% 100% Barthel Score 0-100 100 99-80 79-60 59-40 20-39 1-19 
 0 The Barthel ADL Index: Guidelines 1. The index should be used as a record of what a patient does, not as a record of what a patient could do. 2. The main aim is to establish degree of independence from any help, physical or verbal, however minor and for whatever reason. 3. The need for supervision renders the patient not independent. 4. A patient's performance should be established using the best available evidence. Asking the patient, friends/relatives and nurses are the usual sources, but direct observation and common sense are also important. However direct testing is not needed. 5. Usually the patient's performance over the preceding 24-48 hours is important, but occasionally longer periods will be relevant. 6. Middle categories imply that the patient supplies over 50 per cent of the effort. 7. Use of aids to be independent is allowed. [facility]., Barthel, D.W. (9122). Functional evaluation: the Barthel Index. [address] [facility] (14)2. Gaile Rubinstein neto ABDELRAHMAN Loja, Dallamjanice Stein., Carin Truong., [facility], [address] [place] [address] (). Measuring the change indisability after inpatient rehabilitation; comparison of the responsiveness of the Barthel Index and Functional Foster Measure. Journal of Neurology, Neurosurgery, and Psychiatry, 66(4), 601-187. Ashley Bolanos, N.[email or web address].A, ELANA Roberson, & Tess Rojas M.A. (2004.) Assessment of post-stroke quality of life in cost-effectiveness studies: The usefulness of the Barthel Index and the EuroQoL-5D. [facility], 13, 055-33 Occupational Therapy Evaluation Charge Determination History Examination Decision-Making LOW Complexity : Brief history review  LOW Complexity : 1-3 performance deficits relating to physical, cognitive , or psychosocial skils that result in activity limitations and / or participation restrictions  LOW Complexity : No comorbidities that affect functional and no verbal or physical assistance needed to complete eval tasks Based on the above components, the patient evaluation is determined to be of the following complexity level: LOW Pain: 
Reports mild surgical pain Activity Tolerance:  
good Please refer to the flowsheet for vital signs taken during this treatment. After treatment patient left:  
Supine in bed , call bell COMMUNICATION/EDUCATION:  
The patients evaluation was discussed with: Physical Therapist and Registered Nurse. Thank you for this referral. 
Kirill Pina OT Time Calculation: 20 mins

## 2019-03-28 NOTE — PROGRESS NOTES
Problem: Falls - Risk of 
Goal: *Absence of Falls Description Document Carlotta Zafar Fall Risk and appropriate interventions in the flowsheet. Outcome: Progressing Towards Goal 
Note:  
Fall Risk Interventions: 
Mobility Interventions: Patient to call before getting OOB Medication Interventions: Patient to call before getting OOB Elimination Interventions: Call light in reach

## 2020-06-01 ENCOUNTER — RX ONLY (RX ONLY)
Age: 70
End: 2020-06-01

## 2020-06-01 ENCOUNTER — APPOINTMENT (OUTPATIENT)
Age: 70
Setting detail: DERMATOLOGY
End: 2020-06-02

## 2020-06-01 DIAGNOSIS — Z85.828 PERSONAL HISTORY OF OTHER MALIGNANT NEOPLASM OF SKIN: ICD-10-CM

## 2020-06-01 DIAGNOSIS — L82.0 INFLAMED SEBORRHEIC KERATOSIS: ICD-10-CM

## 2020-06-01 DIAGNOSIS — B07.8 OTHER VIRAL WARTS: ICD-10-CM

## 2020-06-01 DIAGNOSIS — L57.0 ACTINIC KERATOSIS: ICD-10-CM

## 2020-06-01 PROBLEM — D23.5 OTHER BENIGN NEOPLASM OF SKIN OF TRUNK: Status: ACTIVE | Noted: 2020-06-01

## 2020-06-01 PROCEDURE — 17000 DESTRUCT PREMALG LESION: CPT | Mod: 59

## 2020-06-01 PROCEDURE — 17110 DESTRUCT B9 LESION 1-14: CPT

## 2020-06-01 PROCEDURE — OTHER COUNSELING: OTHER

## 2020-06-01 PROCEDURE — OTHER BENIGN DESTRUCTION: OTHER

## 2020-06-01 PROCEDURE — OTHER LIQUID NITROGEN: OTHER

## 2020-06-01 PROCEDURE — 99202 OFFICE O/P NEW SF 15 MIN: CPT | Mod: 25

## 2020-06-01 PROCEDURE — OTHER MIPS QUALITY: OTHER

## 2020-06-01 PROCEDURE — 17003 DESTRUCT PREMALG LES 2-14: CPT | Mod: 59

## 2020-06-01 RX ORDER — CLOBETASOL PROPIONATE 0.5 MG/G
AEROSOL, FOAM TOPICAL
Qty: 1 | Refills: 1 | Status: ERX | COMMUNITY
Start: 2020-06-01

## 2020-06-01 ASSESSMENT — LOCATION SIMPLE DESCRIPTION DERM
LOCATION SIMPLE: LEFT SHOULDER
LOCATION SIMPLE: RIGHT SHOULDER
LOCATION SIMPLE: LEFT PRETIBIAL REGION
LOCATION SIMPLE: NOSE
LOCATION SIMPLE: ABDOMEN

## 2020-06-01 ASSESSMENT — LOCATION DETAILED DESCRIPTION DERM
LOCATION DETAILED: LEFT PROXIMAL PRETIBIAL REGION
LOCATION DETAILED: RIGHT ANTERIOR SHOULDER
LOCATION DETAILED: NASAL DORSUM
LOCATION DETAILED: LEFT ANTERIOR SHOULDER
LOCATION DETAILED: NASAL SUPRATIP
LOCATION DETAILED: RIGHT RIB CAGE
LOCATION DETAILED: EPIGASTRIC SKIN

## 2020-06-01 ASSESSMENT — LOCATION ZONE DERM
LOCATION ZONE: LEG
LOCATION ZONE: TRUNK
LOCATION ZONE: NOSE
LOCATION ZONE: ARM

## 2020-06-01 NOTE — PROCEDURE: BENIGN DESTRUCTION
Render Note In Bullet Format When Appropriate: No
Include Z78.9 (Other Specified Conditions Influencing Health Status) As An Associated Diagnosis?: Yes
Anesthesia Volume In Cc: 0
Medical Necessity Information: It is in your best interest to select a reason for this procedure from the list below. All of these items fulfill various CMS LCD requirements except the new and changing color options.
Post-Care Instructions: I reviewed with the patient in detail post-care instructions. Patient is to wear sunprotection, and avoid picking at any of the treated lesions. Pt may apply Vaseline to crusted or scabbing areas.
Detail Level: Detailed
Consent: The patient's consent was obtained including but not limited to risks of crusting, scabbing, blistering, scarring, darker or lighter pigmentary change, recurrence, incomplete removal and infection.

## 2020-06-01 NOTE — PROCEDURE: MIPS QUALITY
Quality 138: Melanoma: Coordination Of Care: Treatment plan not communicated, reason not otherwise specified.
Quality 111:Pneumonia Vaccination Status For Older Adults: Pneumococcal Vaccination Previously Received
Quality 110: Preventive Care And Screening: Influenza Immunization: Influenza Immunization Administered during Influenza season
Detail Level: Detailed
Quality 226: Preventive Care And Screening: Tobacco Use: Screening And Cessation Intervention: Patient screened for tobacco use and is an ex/non-smoker
Quality 137: Melanoma: Continuity Of Care - Recall System: Recall system not utilized, reason not otherwise specified
Quality 130: Documentation Of Current Medications In The Medical Record: Current Medications Documented
Quality 47: Advance Care Plan: Advance Care Planning discussed and documented; advance care plan or surrogate decision maker documented in the medical record.
Quality 431: Preventive Care And Screening: Unhealthy Alcohol Use - Screening: Patient screened for unhealthy alcohol use using a single question and scores less than 2 times per year

## 2021-07-15 ENCOUNTER — APPOINTMENT (OUTPATIENT)
Age: 71
Setting detail: DERMATOLOGY
End: 2021-07-16

## 2021-07-15 DIAGNOSIS — D22 MELANOCYTIC NEVI: ICD-10-CM

## 2021-07-15 DIAGNOSIS — L81.4 OTHER MELANIN HYPERPIGMENTATION: ICD-10-CM

## 2021-07-15 DIAGNOSIS — L82.1 OTHER SEBORRHEIC KERATOSIS: ICD-10-CM

## 2021-07-15 DIAGNOSIS — L21.8 OTHER SEBORRHEIC DERMATITIS: ICD-10-CM

## 2021-07-15 DIAGNOSIS — D18.0 HEMANGIOMA: ICD-10-CM

## 2021-07-15 DIAGNOSIS — Z85.828 PERSONAL HISTORY OF OTHER MALIGNANT NEOPLASM OF SKIN: ICD-10-CM

## 2021-07-15 DIAGNOSIS — D485 NEOPLASM OF UNCERTAIN BEHAVIOR OF SKIN: ICD-10-CM

## 2021-07-15 DIAGNOSIS — L82.0 INFLAMED SEBORRHEIC KERATOSIS: ICD-10-CM

## 2021-07-15 PROBLEM — D18.01 HEMANGIOMA OF SKIN AND SUBCUTANEOUS TISSUE: Status: ACTIVE | Noted: 2021-07-15

## 2021-07-15 PROBLEM — L57.9 SKIN CHANGES DUE TO CHRONIC EXPOSURE TO NONIONIZING RADIATION, UNSPECIFIED: Status: ACTIVE | Noted: 2021-07-15

## 2021-07-15 PROBLEM — D22.5 MELANOCYTIC NEVI OF TRUNK: Status: ACTIVE | Noted: 2021-07-15

## 2021-07-15 PROBLEM — D48.5 NEOPLASM OF UNCERTAIN BEHAVIOR OF SKIN: Status: ACTIVE | Noted: 2021-07-15

## 2021-07-15 PROCEDURE — 17110 DESTRUCT B9 LESION 1-14: CPT

## 2021-07-15 PROCEDURE — OTHER COUNSELING: OTHER

## 2021-07-15 PROCEDURE — OTHER REASSURANCE: OTHER

## 2021-07-15 PROCEDURE — OTHER MIPS QUALITY: OTHER

## 2021-07-15 PROCEDURE — 11102 TANGNTL BX SKIN SINGLE LES: CPT | Mod: 59

## 2021-07-15 PROCEDURE — OTHER BENIGN DESTRUCTION: OTHER

## 2021-07-15 PROCEDURE — OTHER SNIP BIOPSY: OTHER

## 2021-07-15 PROCEDURE — OTHER OBSERVATION: OTHER

## 2021-07-15 PROCEDURE — OTHER SUNSCREEN RECOMMENDATIONS: OTHER

## 2021-07-15 PROCEDURE — 99213 OFFICE O/P EST LOW 20 MIN: CPT | Mod: 25

## 2021-07-15 ASSESSMENT — LOCATION DETAILED DESCRIPTION DERM
LOCATION DETAILED: RIGHT RIB CAGE
LOCATION DETAILED: RIGHT ANTERIOR SHOULDER
LOCATION DETAILED: PERIUMBILICAL SKIN
LOCATION DETAILED: RIGHT POSTERIOR AXILLA
LOCATION DETAILED: RIGHT INFERIOR UPPER BACK
LOCATION DETAILED: LEFT AXILLARY VAULT
LOCATION DETAILED: LEFT INFERIOR LATERAL MIDBACK
LOCATION DETAILED: LEFT MID-UPPER BACK

## 2021-07-15 ASSESSMENT — LOCATION SIMPLE DESCRIPTION DERM
LOCATION SIMPLE: ABDOMEN
LOCATION SIMPLE: RIGHT UPPER BACK
LOCATION SIMPLE: RIGHT SHOULDER
LOCATION SIMPLE: LEFT AXILLARY VAULT
LOCATION SIMPLE: LEFT UPPER BACK
LOCATION SIMPLE: LEFT LOWER BACK
LOCATION SIMPLE: RIGHT POSTERIOR AXILLA

## 2021-07-15 ASSESSMENT — LOCATION ZONE DERM
LOCATION ZONE: TRUNK
LOCATION ZONE: ARM
LOCATION ZONE: AXILLAE

## 2021-07-15 NOTE — PROCEDURE: SNIP BIOPSY
Type Of Destruction Used: Curettage
Hemostasis: Drysol
Depth Of Biopsy: dermis
Silver Nitrate Text: The wound bed was treated with silver nitrate after the biopsy was performed.
Dressing: bandage
Validate Triangulation: No
Electrodesiccation Text: The wound bed was treated with electrodesiccation after the biopsy was performed.
Post-Care Instructions: I reviewed with the patient in detail post-care instructions. Patient is to keep the biopsy site dry overnight, and then apply bacitracin twice daily until healed. Patient may apply hydrogen peroxide soaks to remove any crusting.
Information: Selecting Yes will display possible errors in your note based on the variables you have selected. This validation is only offered as a suggestion for you. PLEASE NOTE THAT THE VALIDATION TEXT WILL BE REMOVED WHEN YOU FINALIZE YOUR NOTE. IF YOU WANT TO FAX A PRELIMINARY NOTE YOU WILL NEED TO TOGGLE THIS TO 'NO' IF YOU DO NOT WANT IT IN YOUR FAXED NOTE.
Electrodesiccation And Curettage Text: The wound bed was treated with electrodesiccation and curettage after the biopsy was performed.
Billing Type: Third-Party Bill
Anesthesia Volume In Cc (Will Not Render If 0): 0.5
Cryotherapy Text: The wound bed was treated with cryotherapy after the biopsy was performed.
Consent: Written consent was obtained and risks were reviewed including but not limited to scarring, infection, bleeding, scabbing, incomplete removal, nerve damage and allergy to anesthesia.
Biopsy Method: scissors
Curettage Text: The wound bed was treated with curettage after the biopsy was performed.
Anesthesia Type: 1% lidocaine with epinephrine
Size Of Lesion In Cm: 0
Wound Care: Petrolatum
Detail Level: Detailed
Biopsy Type: H and E
Notification Instructions: Patient will be notified of biopsy results. However, patient instructed to call the office if not contacted within 2 weeks.

## 2021-07-15 NOTE — PROCEDURE: MIPS QUALITY
Quality 137: Melanoma: Continuity Of Care - Recall System: Recall system not utilized, reason not otherwise specified
Detail Level: Detailed
Quality 431: Preventive Care And Screening: Unhealthy Alcohol Use - Screening: Patient screened for unhealthy alcohol use using a single question and scores less than 2 times per year
Quality 110: Preventive Care And Screening: Influenza Immunization: Influenza Immunization Administered during Influenza season
Quality 226: Preventive Care And Screening: Tobacco Use: Screening And Cessation Intervention: Patient screened for tobacco use and is an ex/non-smoker
Quality 111:Pneumonia Vaccination Status For Older Adults: Pneumococcal Vaccination Previously Received
Quality 138: Melanoma: Coordination Of Care: Treatment plan not communicated, reason not otherwise specified.
Quality 47: Advance Care Plan: Advance Care Planning discussed and documented; advance care plan or surrogate decision maker documented in the medical record.
Quality 130: Documentation Of Current Medications In The Medical Record: Current Medications Documented

## 2021-07-15 NOTE — PROCEDURE: BENIGN DESTRUCTION
Detail Level: Detailed
Medical Necessity Clause: This procedure was medically necessary because the lesions that were treated were: growth near the eye
Treatment Number (Will Not Render If 0): 0
Render Post-Care Instructions In Note?: no
Medical Necessity Information: It is in your best interest to select a reason for this procedure from the list below. All of these items fulfill various CMS LCD requirements except the new and changing color options.
Anesthesia Volume In Cc: 0.5
Post-Care Instructions: I reviewed with the patient in detail post-care instructions. Patient is to wear sunprotection, and avoid picking at any of the treated lesions. Pt may apply Vaseline to crusted or scabbing areas.
Consent: The patient's consent was obtained including but not limited to risks of crusting, scabbing, blistering, scarring, darker or lighter pigmentary change, recurrence, incomplete removal and infection.

## 2021-07-15 NOTE — PROCEDURE: OBSERVATION
Size Of Lesion In Cm (Optional): 0
Body Location Override (Optional - Billing Will Still Be Based On Selected Body Map Location If Applicable): throughout body
Detail Level: Detailed
Body Location Override (Optional - Billing Will Still Be Based On Selected Body Map Location If Applicable): body throughout

## (undated) DEVICE — STERILE POLYISOPRENE POWDER-FREE SURGICAL GLOVES: Brand: PROTEXIS

## (undated) DEVICE — SOLUTION IRRIG 3000ML 0.9% SOD CHL FLX CONT 0797208] ICU MEDICAL INC]

## (undated) DEVICE — SLIM BODY SKIN STAPLER: Brand: APPOSE ULC

## (undated) DEVICE — 2T11 #2 PDO 36 X 36: Brand: 2T11 #2 PDO 36 X 36

## (undated) DEVICE — ZIMMER® STERILE DISPOSABLE TOURNIQUET CUFF WITH PLC, DUAL PORT, SINGLE BLADDER, 34 IN. (86 CM)

## (undated) DEVICE — STERILE POLYISOPRENE POWDER-FREE SURGICAL GLOVES WITH EMOLLIENT COATING: Brand: PROTEXIS

## (undated) DEVICE — NEEDLE HYPO 22GA L1.5IN BLK S STL HUB POLYPR SHLD REG BVL

## (undated) DEVICE — SUTURE VCRL 1 L27IN ABSRB CT BRAID COAT UD J281H

## (undated) DEVICE — YANKAUER OPEN TIP, NO VENT: Brand: ARGYLE

## (undated) DEVICE — Device

## (undated) DEVICE — SUTURE MCRYL SZ 3-0 L27IN ABSRB UD L24MM PS-1 3/8 CIR PRIM Y936H

## (undated) DEVICE — TRAY CATH 16F URIN MTR LTX -- CONVERT TO ITEM 363111

## (undated) DEVICE — SYR 10ML LUER LOK 1/5ML GRAD --

## (undated) DEVICE — SYRINGE MED 20ML STD CLR PLAS LUERLOCK TIP N CTRL DISP

## (undated) DEVICE — REM POLYHESIVE ADULT PATIENT RETURN ELECTRODE: Brand: VALLEYLAB

## (undated) DEVICE — SUTURE STRATAFIX SPRL SZ 1 L14IN ABSRB VLT L48CM CTX 1/2 SXPD2B405

## (undated) DEVICE — APPLICATOR BNDG 1MM ADH PREMIERPRO EXOFIN

## (undated) DEVICE — DEVON™ KNEE AND BODY STRAP 60" X 3" (1.5 M X 7.6 CM): Brand: DEVON

## (undated) DEVICE — PADDING CST 6IN STERILE --

## (undated) DEVICE — SUTURE VCRL SZ 2-0 L36IN ABSRB UD L40MM CT 1/2 CIR J957H

## (undated) DEVICE — SKIN MARKER,REGULAR TIP WITH RULER AND LABELS: Brand: DEVON

## (undated) DEVICE — T5 HOOD WITH PEEL AWAY FACE SHIELD

## (undated) DEVICE — Z DISCONTINUED USE 2744636  DRESSING AQUACEL 14 IN ALG W3.5XL14IN POLYUR FLM CVR W/ HYDRCOLL

## (undated) DEVICE — DRAPE,EXTREMITY,89X128,STERILE: Brand: MEDLINE

## (undated) DEVICE — INTENDED FOR TISSUE SEPARATION, AND OTHER PROCEDURES THAT REQUIRE A SHARP SURGICAL BLADE TO PUNCTURE OR CUT.: Brand: BARD-PARKER ® CARBON RIB-BACK BLADES

## (undated) DEVICE — CONTAINER,SPECIMEN,3OZ,OR STRL: Brand: MEDLINE

## (undated) DEVICE — GAUZE SPONGES,12 PLY: Brand: CURITY

## (undated) DEVICE — SPONGE LAP 18X18IN STRL -- 5/PK

## (undated) DEVICE — PADDING CAST SPEC 6INX4YD COT --

## (undated) DEVICE — STRYKER PERFORMANCE SERIES SAGITTAL BLADE: Brand: STRYKER PERFORMANCE SERIES

## (undated) DEVICE — HOOK LOCK LATEX FREE ELASTIC BANDAGE D/L 6INX10YD

## (undated) DEVICE — 3M™ IOBAN™ 2 ANTIMICROBIAL INCISE DRAPE 6651EZ: Brand: IOBAN™ 2

## (undated) DEVICE — 4-PORT MANIFOLD: Brand: NEPTUNE 2

## (undated) DEVICE — CONTAINER,SPECIMEN,4OZ,OR STRL: Brand: MEDLINE

## (undated) DEVICE — INFECTION CONTROL KIT SYS

## (undated) DEVICE — DRAPE,REIN 53X77,STERILE: Brand: MEDLINE

## (undated) DEVICE — HANDPIECE SET WITH BONE CLEANING TIP AND SUCTION TUBE: Brand: INTERPULSE

## (undated) DEVICE — CARTRIDGE BNE CEM MIX UNIV TWR VAC ROTOR BRK OFF NOZ W/O

## (undated) DEVICE — BOWL BNE CEM MIX SPAT CURET SMARTMIX CTS

## (undated) DEVICE — (D)PREP SKN CHLRAPRP APPL 26ML -- CONVERT TO ITEM 371833

## (undated) DEVICE — PREP SKN PREVAIL 40ML APPL --

## (undated) DEVICE — X-RAY SPONGES,16 PLY: Brand: DERMACEA

## (undated) DEVICE — SCRUB DRY SURG EZ SCRUB BRUSH PREOPERATIVE GRN

## (undated) DEVICE — SUTURE VCRL SZ 0 L27IN ABSRB UD L36MM CT-1 1/2 CIR J260H

## (undated) DEVICE — SOLUTION IRRIG 1000ML H2O STRL BLT

## (undated) DEVICE — GOWN,PREVENTION PLUS,XLN/2XL,ST,22/CS: Brand: MEDLINE